# Patient Record
Sex: FEMALE | Race: OTHER | HISPANIC OR LATINO | Employment: FULL TIME | URBAN - METROPOLITAN AREA
[De-identification: names, ages, dates, MRNs, and addresses within clinical notes are randomized per-mention and may not be internally consistent; named-entity substitution may affect disease eponyms.]

---

## 2017-03-01 ENCOUNTER — ANESTHESIA EVENT (OUTPATIENT)
Dept: PERIOP | Facility: HOSPITAL | Age: 47
End: 2017-03-01
Payer: SELF-PAY

## 2017-03-01 RX ORDER — ACETAMINOPHEN 325 MG/1
650 TABLET ORAL EVERY 6 HOURS PRN
COMMUNITY

## 2017-03-01 RX ORDER — IBUPROFEN 200 MG
200 TABLET ORAL EVERY 6 HOURS PRN
COMMUNITY
End: 2017-03-03 | Stop reason: HOSPADM

## 2017-03-01 RX ORDER — MAG HYDROX/ALUMINUM HYD/SIMETH 400-400-40
5000 SUSPENSION, ORAL (FINAL DOSE FORM) ORAL DAILY
COMMUNITY
End: 2018-09-07 | Stop reason: ALTCHOICE

## 2017-03-01 RX ORDER — MULTIVITAMIN
1 TABLET ORAL DAILY
COMMUNITY
End: 2018-09-07 | Stop reason: ALTCHOICE

## 2017-03-02 ENCOUNTER — HOSPITAL ENCOUNTER (OUTPATIENT)
Facility: HOSPITAL | Age: 47
Setting detail: OBSERVATION
Discharge: HOME | End: 2017-03-03
Attending: PLASTIC SURGERY | Admitting: PLASTIC SURGERY
Payer: SELF-PAY

## 2017-03-02 ENCOUNTER — ANESTHESIA (OUTPATIENT)
Dept: PERIOP | Facility: HOSPITAL | Age: 47
End: 2017-03-02
Payer: SELF-PAY

## 2017-03-02 PROBLEM — Z41.1 ENCOUNTER FOR COSMETIC SURGERY: Status: ACTIVE | Noted: 2017-03-02

## 2017-03-02 RX ORDER — EPHEDRINE SULFATE 50 MG/ML
INJECTION, SOLUTION INTRAVENOUS AS NEEDED
Status: DISCONTINUED | OUTPATIENT
Start: 2017-03-02 | End: 2017-03-02 | Stop reason: SURG

## 2017-03-02 RX ORDER — FENTANYL CITRATE 50 UG/ML
INJECTION, SOLUTION INTRAMUSCULAR; INTRAVENOUS AS NEEDED
Status: DISCONTINUED | OUTPATIENT
Start: 2017-03-02 | End: 2017-03-02 | Stop reason: SURG

## 2017-03-02 RX ORDER — FENTANYL CITRATE/PF 50 MCG/ML
50 SYRINGE (ML) INJECTION
Status: COMPLETED | OUTPATIENT
Start: 2017-03-02 | End: 2017-03-02

## 2017-03-02 RX ORDER — ONDANSETRON 2 MG/ML
INJECTION INTRAMUSCULAR; INTRAVENOUS AS NEEDED
Status: DISCONTINUED | OUTPATIENT
Start: 2017-03-02 | End: 2017-03-02 | Stop reason: SURG

## 2017-03-02 RX ORDER — SCOLOPAMINE TRANSDERMAL SYSTEM 1 MG/1
1 PATCH, EXTENDED RELEASE TRANSDERMAL ONCE AS NEEDED
Status: DISCONTINUED | OUTPATIENT
Start: 2017-03-02 | End: 2017-03-02

## 2017-03-02 RX ORDER — ONDANSETRON 2 MG/ML
4 INJECTION INTRAMUSCULAR; INTRAVENOUS ONCE
Status: COMPLETED | OUTPATIENT
Start: 2017-03-02 | End: 2017-03-02

## 2017-03-02 RX ORDER — HYDROMORPHONE HYDROCHLORIDE 2 MG/ML
INJECTION, SOLUTION INTRAMUSCULAR; INTRAVENOUS; SUBCUTANEOUS AS NEEDED
Status: DISCONTINUED | OUTPATIENT
Start: 2017-03-02 | End: 2017-03-02 | Stop reason: SURG

## 2017-03-02 RX ORDER — SPIRONOLACTONE 50 MG/1
50 TABLET, FILM COATED ORAL DAILY
Status: DISCONTINUED | OUTPATIENT
Start: 2017-03-03 | End: 2017-03-03 | Stop reason: HOSPADM

## 2017-03-02 RX ORDER — MORPHINE SULFATE 4 MG/ML
3 INJECTION, SOLUTION INTRAMUSCULAR; INTRAVENOUS
Status: DISCONTINUED | OUTPATIENT
Start: 2017-03-02 | End: 2017-03-03 | Stop reason: HOSPADM

## 2017-03-02 RX ORDER — ONDANSETRON 2 MG/ML
4 INJECTION INTRAMUSCULAR; INTRAVENOUS EVERY 8 HOURS PRN
Status: DISCONTINUED | OUTPATIENT
Start: 2017-03-02 | End: 2017-03-02 | Stop reason: HOSPADM

## 2017-03-02 RX ORDER — OXYCODONE HYDROCHLORIDE AND ACETAMINOPHEN 5; 325 MG/1; MG/1
2 TABLET ORAL EVERY 4 HOURS PRN
Status: DISCONTINUED | OUTPATIENT
Start: 2017-03-02 | End: 2017-03-03 | Stop reason: HOSPADM

## 2017-03-02 RX ORDER — DOCUSATE SODIUM 100 MG/1
100 CAPSULE, LIQUID FILLED ORAL 2 TIMES DAILY
Status: DISCONTINUED | OUTPATIENT
Start: 2017-03-02 | End: 2017-03-03 | Stop reason: HOSPADM

## 2017-03-02 RX ORDER — PROPOFOL 10 MG/ML
INJECTION, EMULSION INTRAVENOUS AS NEEDED
Status: DISCONTINUED | OUTPATIENT
Start: 2017-03-02 | End: 2017-03-02 | Stop reason: SURG

## 2017-03-02 RX ORDER — PROMETHAZINE HYDROCHLORIDE 25 MG/1
25 TABLET ORAL EVERY 6 HOURS PRN
Status: DISCONTINUED | OUTPATIENT
Start: 2017-03-02 | End: 2017-03-03 | Stop reason: HOSPADM

## 2017-03-02 RX ORDER — MIDAZOLAM HYDROCHLORIDE 1 MG/ML
INJECTION INTRAMUSCULAR; INTRAVENOUS AS NEEDED
Status: DISCONTINUED | OUTPATIENT
Start: 2017-03-02 | End: 2017-03-02 | Stop reason: SURG

## 2017-03-02 RX ORDER — DIPHENHYDRAMINE HCL 25 MG
50 TABLET ORAL EVERY 6 HOURS PRN
Status: DISCONTINUED | OUTPATIENT
Start: 2017-03-02 | End: 2017-03-03 | Stop reason: HOSPADM

## 2017-03-02 RX ORDER — SCOLOPAMINE TRANSDERMAL SYSTEM 1 MG/1
1 PATCH, EXTENDED RELEASE TRANSDERMAL ONCE AS NEEDED
Status: DISCONTINUED | OUTPATIENT
Start: 2017-03-02 | End: 2017-03-02 | Stop reason: SDUPTHER

## 2017-03-02 RX ORDER — ONDANSETRON 2 MG/ML
4 INJECTION INTRAMUSCULAR; INTRAVENOUS EVERY 6 HOURS PRN
Status: DISCONTINUED | OUTPATIENT
Start: 2017-03-02 | End: 2017-03-03 | Stop reason: HOSPADM

## 2017-03-02 RX ORDER — SODIUM CHLORIDE, SODIUM LACTATE, POTASSIUM CHLORIDE, CALCIUM CHLORIDE 600; 310; 30; 20 MG/100ML; MG/100ML; MG/100ML; MG/100ML
175 INJECTION, SOLUTION INTRAVENOUS CONTINUOUS
Status: DISCONTINUED | OUTPATIENT
Start: 2017-03-02 | End: 2017-03-03 | Stop reason: HOSPADM

## 2017-03-02 RX ORDER — OXYCODONE HYDROCHLORIDE AND ACETAMINOPHEN 5; 325 MG/1; MG/1
2 TABLET ORAL EVERY 4 HOURS PRN
Status: DISCONTINUED | OUTPATIENT
Start: 2017-03-02 | End: 2017-03-02 | Stop reason: HOSPADM

## 2017-03-02 RX ORDER — SODIUM CHLORIDE 9 MG/ML
125 INJECTION, SOLUTION INTRAVENOUS CONTINUOUS
Status: DISCONTINUED | OUTPATIENT
Start: 2017-03-02 | End: 2017-03-02 | Stop reason: HOSPADM

## 2017-03-02 RX ORDER — ROCURONIUM BROMIDE 10 MG/ML
INJECTION, SOLUTION INTRAVENOUS AS NEEDED
Status: DISCONTINUED | OUTPATIENT
Start: 2017-03-02 | End: 2017-03-02 | Stop reason: SURG

## 2017-03-02 RX ADMIN — SCOPALAMINE 1 PATCH: 1 PATCH, EXTENDED RELEASE TRANSDERMAL at 10:23

## 2017-03-02 RX ADMIN — HYDROMORPHONE HYDROCHLORIDE 0.5 MG: 2 INJECTION, SOLUTION INTRAMUSCULAR; INTRAVENOUS; SUBCUTANEOUS at 15:39

## 2017-03-02 RX ADMIN — FENTANYL CITRATE 50 MCG: 50 INJECTION INTRAMUSCULAR; INTRAVENOUS at 18:45

## 2017-03-02 RX ADMIN — NEOSTIGMINE METHYLSULFATE 4 MG: 1 INJECTION INTRAMUSCULAR; INTRAVENOUS; SUBCUTANEOUS at 14:05

## 2017-03-02 RX ADMIN — FENTANYL CITRATE 100 MCG: 50 INJECTION, SOLUTION INTRAMUSCULAR; INTRAVENOUS at 14:16

## 2017-03-02 RX ADMIN — CEFAZOLIN SODIUM 2000 MG: 2 SOLUTION INTRAVENOUS at 16:52

## 2017-03-02 RX ADMIN — SODIUM CHLORIDE, SODIUM LACTATE, POTASSIUM CHLORIDE, AND CALCIUM CHLORIDE 175 ML/HR: .6; .31; .03; .02 INJECTION, SOLUTION INTRAVENOUS at 22:42

## 2017-03-02 RX ADMIN — ROCURONIUM BROMIDE 20 MG: 10 INJECTION, SOLUTION INTRAVENOUS at 14:46

## 2017-03-02 RX ADMIN — FENTANYL CITRATE 50 MCG: 50 INJECTION INTRAMUSCULAR; INTRAVENOUS at 18:01

## 2017-03-02 RX ADMIN — HYDROMORPHONE HYDROCHLORIDE 0.5 MG: 1 INJECTION, SOLUTION INTRAMUSCULAR; INTRAVENOUS; SUBCUTANEOUS at 19:09

## 2017-03-02 RX ADMIN — PROPOFOL 200 MG: 10 INJECTION, EMULSION INTRAVENOUS at 12:47

## 2017-03-02 RX ADMIN — ROCURONIUM BROMIDE 20 MG: 10 INJECTION, SOLUTION INTRAVENOUS at 13:22

## 2017-03-02 RX ADMIN — FENTANYL CITRATE 100 MCG: 50 INJECTION, SOLUTION INTRAMUSCULAR; INTRAVENOUS at 12:47

## 2017-03-02 RX ADMIN — SODIUM CHLORIDE 125 ML/HR: 0.9 INJECTION, SOLUTION INTRAVENOUS at 10:36

## 2017-03-02 RX ADMIN — EPHEDRINE SULFATE 10 MG: 50 INJECTION, SOLUTION INTRAMUSCULAR; INTRAVENOUS; SUBCUTANEOUS at 15:47

## 2017-03-02 RX ADMIN — HYDROMORPHONE HYDROCHLORIDE 0.5 MG: 2 INJECTION, SOLUTION INTRAMUSCULAR; INTRAVENOUS; SUBCUTANEOUS at 17:25

## 2017-03-02 RX ADMIN — DEXAMETHASONE SODIUM PHOSPHATE 4 MG: 10 INJECTION INTRAMUSCULAR; INTRAVENOUS at 15:28

## 2017-03-02 RX ADMIN — FENTANYL CITRATE 50 MCG: 50 INJECTION INTRAMUSCULAR; INTRAVENOUS at 17:55

## 2017-03-02 RX ADMIN — HYDROMORPHONE HYDROCHLORIDE 0.5 MG: 1 INJECTION, SOLUTION INTRAMUSCULAR; INTRAVENOUS; SUBCUTANEOUS at 18:58

## 2017-03-02 RX ADMIN — HYDROMORPHONE HYDROCHLORIDE 0.5 MG: 2 INJECTION, SOLUTION INTRAMUSCULAR; INTRAVENOUS; SUBCUTANEOUS at 14:12

## 2017-03-02 RX ADMIN — FENTANYL CITRATE 50 MCG: 50 INJECTION INTRAMUSCULAR; INTRAVENOUS at 18:11

## 2017-03-02 RX ADMIN — CEFAZOLIN SODIUM 2000 MG: 2 SOLUTION INTRAVENOUS at 12:52

## 2017-03-02 RX ADMIN — ROCURONIUM BROMIDE 10 MG: 10 INJECTION, SOLUTION INTRAVENOUS at 16:05

## 2017-03-02 RX ADMIN — SODIUM CHLORIDE 125 ML/HR: 0.9 INJECTION, SOLUTION INTRAVENOUS at 19:06

## 2017-03-02 RX ADMIN — FENTANYL CITRATE 100 MCG: 50 INJECTION, SOLUTION INTRAMUSCULAR; INTRAVENOUS at 13:22

## 2017-03-02 RX ADMIN — ONDANSETRON 4 MG: 2 INJECTION INTRAMUSCULAR; INTRAVENOUS at 18:22

## 2017-03-02 RX ADMIN — HYDROMORPHONE HYDROCHLORIDE 0.5 MG: 2 INJECTION, SOLUTION INTRAMUSCULAR; INTRAVENOUS; SUBCUTANEOUS at 16:32

## 2017-03-02 RX ADMIN — ROCURONIUM BROMIDE 50 MG: 10 INJECTION, SOLUTION INTRAVENOUS at 12:47

## 2017-03-02 RX ADMIN — ONDANSETRON HYDROCHLORIDE 4 MG: 2 INJECTION, SOLUTION INTRAVENOUS at 14:00

## 2017-03-02 RX ADMIN — MIDAZOLAM HYDROCHLORIDE 2 MG: 1 INJECTION, SOLUTION INTRAMUSCULAR; INTRAVENOUS at 12:37

## 2017-03-02 RX ADMIN — ONDANSETRON HYDROCHLORIDE 4 MG: 2 INJECTION, SOLUTION INTRAVENOUS at 15:19

## 2017-03-03 VITALS
HEART RATE: 100 BPM | OXYGEN SATURATION: 99 % | DIASTOLIC BLOOD PRESSURE: 83 MMHG | RESPIRATION RATE: 18 BRPM | BODY MASS INDEX: 37.56 KG/M2 | TEMPERATURE: 98.1 F | WEIGHT: 212 LBS | SYSTOLIC BLOOD PRESSURE: 130 MMHG | HEIGHT: 63 IN

## 2017-03-03 RX ORDER — HEPARIN SODIUM 5000 [USP'U]/ML
5000 INJECTION, SOLUTION INTRAVENOUS; SUBCUTANEOUS EVERY 8 HOURS SCHEDULED
Status: DISCONTINUED | OUTPATIENT
Start: 2017-03-03 | End: 2017-03-03 | Stop reason: HOSPADM

## 2017-03-03 RX ADMIN — SODIUM CHLORIDE, SODIUM LACTATE, POTASSIUM CHLORIDE, AND CALCIUM CHLORIDE 175 ML/HR: .6; .31; .03; .02 INJECTION, SOLUTION INTRAVENOUS at 06:47

## 2017-03-03 RX ADMIN — DOCUSATE SODIUM 100 MG: 100 CAPSULE, LIQUID FILLED ORAL at 09:12

## 2017-03-03 RX ADMIN — OXYCODONE HYDROCHLORIDE AND ACETAMINOPHEN 2 TABLET: 5; 325 TABLET ORAL at 06:46

## 2017-03-03 RX ADMIN — HEPARIN SODIUM 5000 UNITS: 5000 INJECTION, SOLUTION INTRAVENOUS; SUBCUTANEOUS at 09:38

## 2017-03-03 RX ADMIN — CEFAZOLIN SODIUM 2000 MG: 2 SOLUTION INTRAVENOUS at 06:46

## 2017-03-03 RX ADMIN — OXYCODONE HYDROCHLORIDE AND ACETAMINOPHEN 2 TABLET: 5; 325 TABLET ORAL at 03:02

## 2017-03-03 RX ADMIN — SPIRONOLACTONE 50 MG: 50 TABLET ORAL at 09:13

## 2017-03-03 RX ADMIN — PROMETHAZINE HYDROCHLORIDE 25 MG: 25 TABLET ORAL at 00:27

## 2017-03-03 RX ADMIN — ONDANSETRON 4 MG: 2 INJECTION INTRAMUSCULAR; INTRAVENOUS at 03:03

## 2017-03-03 RX ADMIN — OXYCODONE HYDROCHLORIDE AND ACETAMINOPHEN 2 TABLET: 5; 325 TABLET ORAL at 14:55

## 2017-03-03 RX ADMIN — CEFAZOLIN SODIUM 2000 MG: 2 SOLUTION INTRAVENOUS at 00:42

## 2017-03-03 RX ADMIN — OXYCODONE HYDROCHLORIDE AND ACETAMINOPHEN 2 TABLET: 5; 325 TABLET ORAL at 11:02

## 2018-09-04 ENCOUNTER — TELEPHONE (OUTPATIENT)
Dept: CARDIOLOGY CLINIC | Facility: CLINIC | Age: 48
End: 2018-09-04

## 2018-09-07 ENCOUNTER — OFFICE VISIT (OUTPATIENT)
Dept: CARDIOLOGY CLINIC | Facility: CLINIC | Age: 48
End: 2018-09-07
Payer: COMMERCIAL

## 2018-09-07 VITALS
OXYGEN SATURATION: 99 % | WEIGHT: 217.3 LBS | HEART RATE: 88 BPM | HEIGHT: 63 IN | SYSTOLIC BLOOD PRESSURE: 132 MMHG | BODY MASS INDEX: 38.5 KG/M2 | DIASTOLIC BLOOD PRESSURE: 70 MMHG

## 2018-09-07 DIAGNOSIS — R00.2 PALPITATIONS: ICD-10-CM

## 2018-09-07 DIAGNOSIS — R06.02 SHORTNESS OF BREATH: ICD-10-CM

## 2018-09-07 DIAGNOSIS — R07.9 CHEST PAIN, UNSPECIFIED TYPE: Primary | ICD-10-CM

## 2018-09-07 DIAGNOSIS — K21.9 GASTROESOPHAGEAL REFLUX DISEASE WITHOUT ESOPHAGITIS: ICD-10-CM

## 2018-09-07 PROCEDURE — 93000 ELECTROCARDIOGRAM COMPLETE: CPT | Performed by: INTERNAL MEDICINE

## 2018-09-07 PROCEDURE — 99204 OFFICE O/P NEW MOD 45 MIN: CPT | Performed by: INTERNAL MEDICINE

## 2018-09-07 RX ORDER — MULTIVITAMIN/IRON/FOLIC ACID 18MG-0.4MG
1 TABLET ORAL DAILY
Refills: 0 | COMMUNITY
Start: 2018-08-21 | End: 2018-10-19 | Stop reason: ALTCHOICE

## 2018-09-07 RX ORDER — TRETINOIN 0.5 MG/G
1 CREAM TOPICAL
COMMUNITY
Start: 2018-08-17 | End: 2021-09-29

## 2018-09-07 RX ORDER — MECLIZINE HCL 12.5 MG/1
12.5 TABLET ORAL EVERY 8 HOURS PRN
COMMUNITY
Start: 2018-08-17

## 2018-09-07 RX ORDER — FLUTICASONE PROPIONATE 50 MCG
2 SPRAY, SUSPENSION (ML) NASAL AS NEEDED
COMMUNITY
Start: 2017-10-30 | End: 2019-09-17

## 2018-09-07 NOTE — PROGRESS NOTES
Cardiology Consultation     Sincere Pendleton  [id number]  1970  [address] [facility] [place] [id number]      1  Chest pain, unspecified type  POCT ECG    Stress test only, exercise    Echo complete with contrast if indicated    Holter monitor - 24 hour   2  Gastroesophageal reflux disease without esophagitis     3  Palpitations  Echo complete with contrast if indicated    Holter monitor - 24 hour   4  Shortness of breath  Stress test only, exercise    Echo complete with contrast if indicated       Discussion/Summary:    Chest pain:  Somewhat atypical   Normal baseline EKG  Can check exercise treadmill test   GI evaluation is still undergoing  She only started her PPI about 3 days ago  Upper GI series found a hiatal hernia  Gastroenterology visit is scheduled already  Shortness of breath:  Likely multifactorial   Some evaluation ordered being performed by PCP  Check echocardiogram to exclude cardiac etiology  Palpitations:  Sound most consistent with PACs or PVCs  However, they are bothersome to her  Check 24 hour Holter monitor  Additional evaluation for arrhythmia with exercise treadmill test also as noted above  Discussed typical triggers  Advised to eliminate vaping MJ as could be a precipitant for this as well  Continue with other evaluation as per her PCP  Follow up after testing to review  History of Present Illness:    53-year-old female  She comes to the office today for evaluation of chest pain, shortness of breath, palpitations  She describes 3 distinct symptoms  The 1st is a pinch of her chest   This tends to come in groups of threes  It is random and not always related to exertion  It is located in the center of her chest, does not really radiate    This is different than her abdominal pain which was recently evaluated by her primary care physician for which she has Gastroenterology evaluation planned as well for hiatal hernia  However, there are concerns from their standpoint that these 2 are related  There is no association with food  No association with position  There are some positions which can make it worse  When she presses on her sternum when she has these types of symptoms, she feels a soreness which is different than this pinching pain  She also feels fatigue and shortness of breath  This has been a gradual occurrence  She is being evaluated for anemia, although last CBC was unremarkable  Had blood work done in August including lipid panel which was pretty unremarkable  Denies any PND or orthopnea  No leg edema  Finally, she reports palpitations  She has had a history of the same  They were evaluated about 15 years ago  She says that nothing significant was found  She was never on any medical therapy  However, there still bothersome  They got less frequent for a while, but now come a little bit more regularly  Described as faster pounding heartbeat which lasts for just a 2nd  It is uncomfortable for her  She has tried to identify any potential triggers, but none are found  Never lost consciousness  She does have symptoms of vertigo, and otherwise sometimes feels lightheaded  Does not know all of her family history  One brother has MS  Sister is healthy  Does not know parents family history  Denies history of tobacco use  No significant alcohol  She does vape a small amount of marijuana daily          Patient Active Problem List   Diagnosis    Excess skin of abdomen    Encounter for cosmetic surgery    Chest pain    Gastroesophageal reflux disease without esophagitis    Palpitations    Shortness of breath     Past Medical History:   Diagnosis Date    Acne     Anxiety     Depression     GERD (gastroesophageal reflux disease)     Morbidly obese ([address] )     Wears glasses      Social History     Social History    Marital status: /Civil Union     Spouse name: N/A    Number of children: N/A    Years of education: N/A     Occupational History    Not on file  Social History Main Topics    Smoking status: Never Smoker    Smokeless tobacco: Never Used    Alcohol use No    Drug use: No    Sexual activity: Not on file     Other Topics Concern    Not on file     Social History Narrative    No narrative on file      Family History   Problem Relation Age of Onset    Heart attack Neg Hx     Stroke Neg Hx     Anuerysm Neg Hx     Clotting disorder Neg Hx     Hypertension Neg Hx     Hyperlipidemia Neg Hx     Arrhythmia Neg Hx     Heart failure Neg Hx     Coronary artery disease Neg Hx     Sudden death Neg Hx         scd     Past Surgical History:   Procedure Laterality Date    BODY LIFT LOWER N/A 3/2/2017    Procedure: BODY LIFT ,[place] BUTTOCK ;  Surgeon: Seble Manley MD;  [facility];  Service:     CHOLECYSTECTOMY      LAPAROSCOPY FOR ECTOPIC PREGNANCY      x 2    VT EXCISE EXCESS SKIN TISSUE,ABDOMEN N/A 9/1/2016    Procedure: PANNICULECTOMY,  PLICATION MUSCLES ;  Surgeon: Seble Manley MD;  [facility];  Service: Plastics    VT SUCT Tia Finn N/A 9/1/2016    Procedure: LIPOSUCTION ABDOMEN ;  Surgeon: Seble Manley MD;  [facility];  Service: Plastics       Current Outpatient Prescriptions:     acetaminophen (TYLENOL) 325 mg tablet, Take 650 mg by mouth every 6 (six) hours as needed for mild pain, Disp: , Rfl:     [employer] OMEPRAZOLE 20 MG TBEC, Take 1 tablet by mouth daily, Disp: , Rfl: 0    fluticasone (FLONASE) 50 mcg/act nasal spray, 2 sprays into each nostril as needed, Disp: , Rfl:     meclizine (ANTIVERT) 12 5 MG tablet, Take 12 5 mg by mouth Three times a day, Disp: , Rfl:     spironolactone (ALDACTONE) 50 mg tablet, Take 50 mg by mouth daily  , Disp: , Rfl:     tretinoin (RETIN-A) 0 05 % cream, Apply 1 application topically daily at bedtime as needed, Disp: , Rfl:   Allergies   Allergen Reactions    Sertraline Rash     Other reaction(s): itchy rash, felt "loopy"       Vitals:    09/07/18 0952   BP: 132/70   BP Location: Left arm   Patient Position: Sitting   Cuff Size: Large   Pulse: 88   SpO2: 99%   Weight: 98 6 kg (217 lb 4 8 oz)   Height: 5' 3" (1 6 m)     Vitals:    09/07/18 0952   Weight: 98 6 kg (217 lb 4 8 oz)      Height: 5' 3" (160 cm)   Body mass index is 38 49 kg/m²  Physical Exam:  GENERAL: Alert, well appearing, and in no distress  HEENT:  PERRL, EOMI, no scleral icterus, no conjunctival pallor  NECK:  Supple, No elevated JVP, no thyromegaly, no carotid bruits  HEART:  Regular rate and rhythm, normal S1/S2, no S3/S4, no murmur or rub  LUNGS:  Clear to auscultation bilaterally  ABDOMEN:  Soft, non-tender, positive bowel sounds, no rebound or guarding  EXTREMITIES:  No edema  VASCULAR:  Normal pedal pulses   NEURO: No focal deficits,  SKIN: Normal without suspicious lesions on exposed skin      ROS:  Except as noted in HPI, is otherwise reviewed in detail and a 12 point review of systems is negative  Labs:  Lab Results   Component Value Date    INR 1 01 09/06/2013    GLUF 92 09/06/2013     Lab work reviewed in [facility]    EKG:  Sinus rhythm 88 beats per minute  Normal EKG

## 2018-09-28 ENCOUNTER — APPOINTMENT (EMERGENCY)
Dept: RADIOLOGY | Facility: HOSPITAL | Age: 48
End: 2018-09-28
Payer: COMMERCIAL

## 2018-09-28 ENCOUNTER — HOSPITAL ENCOUNTER (OUTPATIENT)
Facility: HOSPITAL | Age: 48
Setting detail: OBSERVATION
Discharge: HOME | End: 2018-09-29
Attending: EMERGENCY MEDICINE | Admitting: INTERNAL MEDICINE
Payer: COMMERCIAL

## 2018-09-28 DIAGNOSIS — R07.89 ATYPICAL CHEST PAIN: ICD-10-CM

## 2018-09-28 DIAGNOSIS — R07.9 CHEST PAIN: Primary | ICD-10-CM

## 2018-09-28 LAB
ANION GAP SERPL CALCULATED.3IONS-SCNC: 13 MMOL/L (ref 4–13)
APTT PPP: 24 SECONDS (ref 24–36)
BASOPHILS # BLD AUTO: 0.03 THOUSANDS/ΜL (ref 0–0.1)
BASOPHILS NFR BLD AUTO: 0 % (ref 0–1)
BUN SERPL-MCNC: 13 MG/DL (ref 5–25)
CALCIUM SERPL-MCNC: 8.9 MG/DL (ref 8.3–10.1)
CHLORIDE SERPL-SCNC: 99 MMOL/L (ref 100–108)
CO2 SERPL-SCNC: 21 MMOL/L (ref 21–32)
CREAT SERPL-MCNC: 0.7 MG/DL (ref 0.6–1.3)
EOSINOPHIL # BLD AUTO: 0.09 THOUSAND/ΜL (ref 0–0.61)
EOSINOPHIL NFR BLD AUTO: 1 % (ref 0–6)
ERYTHROCYTE [DISTWIDTH] IN BLOOD BY AUTOMATED COUNT: 12 % (ref 11.6–15.1)
GFR SERPL CREATININE-BSD FRML MDRD: 102 ML/MIN/1.73SQ M
GLUCOSE SERPL-MCNC: 95 MG/DL (ref 65–140)
HCT VFR BLD AUTO: 40.4 % (ref 34.8–46.1)
HGB BLD-MCNC: 13.8 G/DL (ref 11.5–15.4)
HOLD SPECIMEN: NORMAL
IMM GRANULOCYTES # BLD AUTO: 0.03 THOUSAND/UL (ref 0–0.2)
IMM GRANULOCYTES NFR BLD AUTO: 0 % (ref 0–2)
INR PPP: 0.96 (ref 0.86–1.17)
LYMPHOCYTES # BLD AUTO: 2.37 THOUSANDS/ΜL (ref 0.6–4.47)
LYMPHOCYTES NFR BLD AUTO: 21 % (ref 14–44)
MAGNESIUM SERPL-MCNC: 1.9 MG/DL (ref 1.6–2.6)
MCH RBC QN AUTO: 30.6 PG (ref 26.8–34.3)
MCHC RBC AUTO-ENTMCNC: 34.2 G/DL (ref 31.4–37.4)
MCV RBC AUTO: 90 FL (ref 82–98)
MONOCYTES # BLD AUTO: 0.66 THOUSAND/ΜL (ref 0.17–1.22)
MONOCYTES NFR BLD AUTO: 6 % (ref 4–12)
NEUTROPHILS # BLD AUTO: 7.88 THOUSANDS/ΜL (ref 1.85–7.62)
NEUTS SEG NFR BLD AUTO: 72 % (ref 43–75)
NRBC BLD AUTO-RTO: 0 /100 WBCS
PLATELET # BLD AUTO: 327 THOUSANDS/UL (ref 149–390)
PMV BLD AUTO: 9.6 FL (ref 8.9–12.7)
POTASSIUM SERPL-SCNC: 4.5 MMOL/L (ref 3.5–5.3)
PROTHROMBIN TIME: 12.5 SECONDS (ref 11.8–14.2)
RBC # BLD AUTO: 4.51 MILLION/UL (ref 3.81–5.12)
SODIUM SERPL-SCNC: 133 MMOL/L (ref 136–145)
TROPONIN I SERPL-MCNC: <0.02 NG/ML
WBC # BLD AUTO: 11.06 THOUSAND/UL (ref 4.31–10.16)

## 2018-09-28 PROCEDURE — 99220 PR INITIAL OBSERVATION CARE/DAY 70 MINUTES: CPT | Performed by: PHYSICIAN ASSISTANT

## 2018-09-28 PROCEDURE — 84484 ASSAY OF TROPONIN QUANT: CPT | Performed by: PHYSICIAN ASSISTANT

## 2018-09-28 PROCEDURE — 85610 PROTHROMBIN TIME: CPT | Performed by: PHYSICIAN ASSISTANT

## 2018-09-28 PROCEDURE — 99285 EMERGENCY DEPT VISIT HI MDM: CPT

## 2018-09-28 PROCEDURE — 71046 X-RAY EXAM CHEST 2 VIEWS: CPT

## 2018-09-28 PROCEDURE — 93005 ELECTROCARDIOGRAM TRACING: CPT

## 2018-09-28 PROCEDURE — 80048 BASIC METABOLIC PNL TOTAL CA: CPT | Performed by: PHYSICIAN ASSISTANT

## 2018-09-28 PROCEDURE — 85025 COMPLETE CBC W/AUTO DIFF WBC: CPT | Performed by: PHYSICIAN ASSISTANT

## 2018-09-28 PROCEDURE — 83735 ASSAY OF MAGNESIUM: CPT | Performed by: PHYSICIAN ASSISTANT

## 2018-09-28 PROCEDURE — 36415 COLL VENOUS BLD VENIPUNCTURE: CPT | Performed by: PHYSICIAN ASSISTANT

## 2018-09-28 PROCEDURE — 96360 HYDRATION IV INFUSION INIT: CPT

## 2018-09-28 PROCEDURE — 85730 THROMBOPLASTIN TIME PARTIAL: CPT | Performed by: PHYSICIAN ASSISTANT

## 2018-09-28 RX ORDER — MECLIZINE HCL 12.5 MG/1
12.5 TABLET ORAL EVERY 8 HOURS PRN
Status: DISCONTINUED | OUTPATIENT
Start: 2018-09-28 | End: 2018-09-29 | Stop reason: HOSPADM

## 2018-09-28 RX ORDER — FLUTICASONE PROPIONATE 50 MCG
2 SPRAY, SUSPENSION (ML) NASAL 2 TIMES DAILY PRN
Status: DISCONTINUED | OUTPATIENT
Start: 2018-09-28 | End: 2018-09-29 | Stop reason: HOSPADM

## 2018-09-28 RX ORDER — FLUTICASONE PROPIONATE 50 MCG
2 SPRAY, SUSPENSION (ML) NASAL AS NEEDED
Status: DISCONTINUED | OUTPATIENT
Start: 2018-09-28 | End: 2018-09-28

## 2018-09-28 RX ORDER — PANTOPRAZOLE SODIUM 40 MG/1
40 TABLET, DELAYED RELEASE ORAL
Status: DISCONTINUED | OUTPATIENT
Start: 2018-09-29 | End: 2018-09-29 | Stop reason: HOSPADM

## 2018-09-28 RX ORDER — ACETAMINOPHEN 325 MG/1
650 TABLET ORAL EVERY 6 HOURS PRN
Status: DISCONTINUED | OUTPATIENT
Start: 2018-09-28 | End: 2018-09-29 | Stop reason: HOSPADM

## 2018-09-28 RX ORDER — SPIRONOLACTONE 25 MG/1
50 TABLET ORAL DAILY
Status: DISCONTINUED | OUTPATIENT
Start: 2018-09-29 | End: 2018-09-29 | Stop reason: HOSPADM

## 2018-09-28 RX ORDER — SODIUM CHLORIDE 9 MG/ML
125 INJECTION, SOLUTION INTRAVENOUS CONTINUOUS
Status: DISCONTINUED | OUTPATIENT
Start: 2018-09-28 | End: 2018-09-28

## 2018-09-28 RX ORDER — ONDANSETRON 2 MG/ML
4 INJECTION INTRAMUSCULAR; INTRAVENOUS EVERY 6 HOURS PRN
Status: DISCONTINUED | OUTPATIENT
Start: 2018-09-28 | End: 2018-09-29 | Stop reason: HOSPADM

## 2018-09-28 RX ADMIN — SODIUM CHLORIDE 125 ML/HR: 0.9 INJECTION, SOLUTION INTRAVENOUS at 16:00

## 2018-09-28 NOTE — ED PROVIDER NOTES
History  Chief Complaint   Patient presents with    Chest Pain     Pt c/o 10/10 chest pain started today  Went to express care and was transported the ED via EMS  Pt c/o SOB  Chest pain comes and goes  This 69-year-old female presents emergency room with a 1-1/2 hour history of midsternal chest pain  She complains of associated diaphoresis  She denies any nausea  She denies any radiation of the pain  She is not presently a smoker  She does not use alcohol products  She does admit to occasionally using marijuana  She has had no recent use in the past week  She was seen at an urgent care center and was referred to the emergency room for further evaluation  She was given 324 mg of aspirin in route by the paramedics  Upon presentation to the emergency room she has no pain  She describes the pain as a squeezing sensation that is intermittent and waxes and wanes  She states she has difficulty swallowing when she has the pain  She is unaware of a past family history of any cardiac disease  Patient states that she has had intermittent chest pain that is only lasted seconds over the past month  She was seen by a cardiologist who setting her up for routine stress test in the near future  She is also set up for stress echo and a Holter monitor  She does have a past medical history that is positive for GERD, anxiety, depression, acne  Patient's differential diagnosis includes but is not limited to acute coronary syndrome, myocarditis, pericarditis, pulmonary embolism, aortic dissection, chest wall strain, soft diet is, gastritis, GERD          History provided by:  Patient  Chest Pain   Pain location:  Substernal area  Pain quality comment:  Squeezing  Pain radiates to:  Does not radiate  Pain radiates to the back: no    Pain severity:  No pain (8/10 at its worst, none now )  Onset quality:  Sudden  Duration:  2 hours  Timing:  Intermittent  Progression:  Resolved  Chronicity:  New  Context: at rest Context: not breathing, no drug use, not eating, not lifting, no movement, not raising an arm, no stress and no trauma    Relieved by:  Rest  Worsened by:  Nothing tried  Ineffective treatments:  None tried  Associated symptoms: anxiety, diaphoresis, dysphagia and heartburn    Associated symptoms: no abdominal pain, no AICD problem, no altered mental status, no anorexia, no back pain, no claudication, no cough, no dizziness, no fatigue, no fever, no headache, no lower extremity edema, no nausea, no near-syncope, no numbness, no orthopnea, no palpitations, no PND, no shortness of breath, no syncope, not vomiting and no weakness    Risk factors: obesity    Risk factors: no aortic disease, no birth control, no coronary artery disease, no diabetes mellitus, no Colin-Danlos syndrome, no high cholesterol, no hypertension, no immobilization, not male, no Marfan's syndrome, not pregnant, no prior DVT/PE, no smoking and no surgery        Prior to Admission Medications   Prescriptions Last Dose Informant Patient Reported? Taking? CVS OMEPRAZOLE 20 MG TBE 2018 at Unknown time Self Yes Yes   Sig: Take 1 tablet by mouth daily   acetaminophen (TYLENOL) 325 mg tablet  Self Yes No   Sig: Take 650 mg by mouth every 6 (six) hours as needed for mild pain   fluticasone (FLONASE) 50 mcg/act nasal spray  Self Yes No   Si sprays into each nostril as needed   meclizine (ANTIVERT) 12 5 MG tablet More than a month at Unknown time Self Yes No   Sig: Take 12 5 mg by mouth every 8 (eight) hours as needed     spironolactone (ALDACTONE) 50 mg tablet 2018 at Unknown time Self Yes Yes   Sig: Take 50 mg by mouth daily     tretinoin (RETIN-A) 0 05 % cream  Self Yes No   Sig: Apply 1 application topically daily at bedtime as needed      Facility-Administered Medications: None       Past Medical History:   Diagnosis Date    Acne     Anxiety     Depression     GERD (gastroesophageal reflux disease)     Morbidly obese ([address] )     Wears glasses        Past Surgical History:   Procedure Laterality Date    BODY LIFT LOWER N/A 3/2/2017    Procedure: BODY LIFT ,[place] BUTTOCK ;  Surgeon: Torrey Funes MD;  [facility];  Service:     CHOLECYSTECTOMY      LAPAROSCOPY FOR ECTOPIC PREGNANCY      x 2    [place] EXCISE EXCESS SKIN TISSUE,ABDOMEN N/A 9/1/2016    Procedure: PANNICULECTOMY,  PLICATION MUSCLES ;  Surgeon: Torrey Funes MD;  [facility];  Service: Plastics    NH SUCT Torrey Diaz N/A 9/1/2016    Procedure: LIPOSUCTION ABDOMEN ;  Surgeon: Torrey Funes MD;  [facility];  Service: Plastics       Family History   Problem Relation Age of Onset    Heart attack Neg Hx     Stroke Neg Hx     Anuerysm Neg Hx     Clotting disorder Neg Hx     Hypertension Neg Hx     Hyperlipidemia Neg Hx     Arrhythmia Neg Hx     Heart failure Neg Hx     Coronary artery disease Neg Hx     Sudden death Neg Hx         scd     I have reviewed and agree with the history as documented  Social History   Substance Use Topics    Smoking status: Never Smoker    Smokeless tobacco: Never Used    Alcohol use No        Review of Systems   Constitutional: Positive for activity change and diaphoresis  Negative for appetite change, chills, fatigue and fever  HENT: Positive for trouble swallowing  Negative for congestion, dental problem, ear discharge, ear pain, mouth sores, postnasal drip, rhinorrhea and sore throat  Eyes: Negative for pain, discharge, redness and itching  Respiratory: Negative for cough, chest tightness and shortness of breath  Cardiovascular: Positive for chest pain  Negative for palpitations, orthopnea, claudication, syncope, PND and near-syncope  Gastrointestinal: Positive for heartburn  Negative for abdominal pain, anorexia, nausea and vomiting  Musculoskeletal: Negative for back pain  Skin: Negative for color change, pallor and rash     Neurological: Negative for dizziness, weakness, numbness and headaches  Psychiatric/Behavioral: Negative for confusion  All other systems reviewed and are negative  Physical Exam  Physical Exam   Constitutional: She is oriented to person, place, and time  She appears well-developed and well-nourished  No distress  HENT:   Head: Normocephalic  Right Ear: External ear normal    Left Ear: External ear normal    Nose: Nose normal    Eyes: Conjunctivae are normal  Right eye exhibits no discharge  Left eye exhibits no discharge  Neck: Neck supple  Cardiovascular: Normal rate, regular rhythm and normal heart sounds  Exam reveals no gallop and no friction rub  No murmur heard  Pulmonary/Chest: Effort normal and breath sounds normal  No respiratory distress  She has no wheezes  She has no rales  Abdominal: Soft  She exhibits no distension  There is no tenderness  There is no rebound and no guarding  Musculoskeletal: She exhibits no edema  Lymphadenopathy:     She has no cervical adenopathy  Neurological: She is alert and oriented to person, place, and time  Skin: Skin is warm  Capillary refill takes less than 2 seconds  She is not diaphoretic  Psychiatric: She has a normal mood and affect  Her behavior is normal  Judgment and thought content normal    Nursing note and vitals reviewed        Vital Signs  ED Triage Vitals   Temperature Pulse Respirations Blood Pressure SpO2   09/28/18 1527 09/28/18 1527 09/28/18 1527 09/28/18 1527 09/28/18 1527   98 2 °F (36 8 °C) 86 20 (!) 181/79 100 %      Temp Source Heart Rate Source Patient Position - Orthostatic VS BP Location FiO2 (%)   09/28/18 1527 09/28/18 1527 09/28/18 1527 09/28/18 1527 --   Oral Monitor Sitting Right arm       Pain Score       09/28/18 1630       No Pain           Vitals:    09/28/18 1527 09/28/18 1630 09/28/18 1700   BP: (!) 181/79 103/66 106/57   Pulse: 86 76 68   Patient Position - Orthostatic VS: Sitting Lying Sitting       Visual Acuity      ED Medications  Medications   sodium chloride 0 9 % infusion (125 mL/hr Intravenous New Bag 9/28/18 1600)       Diagnostic Studies  Results Reviewed     Procedure Component Value Units Date/Time    Trauma tubes on hold [[id number]] Collected:  09/28/18 1553    Lab Status:  Final result Specimen:  Blood Updated:  09/28/18 1701    Narrative: The following orders were created for panel order Trauma tubes on hold  Procedure                               Abnormality         Status                     ---------                               -----------         ------                     Norm [place] top on VUIY[[id number]]                                  Final result               Grey top tube on ZURF[[id number]]                             Final result                 Please view results for these tests on the individual orders      Protime-INR [[id number]]  (Normal) Collected:  09/28/18 1550    Lab Status:  Final result Specimen:  Blood from Arm, Left Updated:  09/28/18 1620     Protime 12 5 seconds      INR 0 96    APTT [[id number]]  (Normal) Collected:  09/28/18 1550    Lab Status:  Final result Specimen:  Blood from Arm, Left Updated:  09/28/18 1620     PTT 24 seconds     Troponin I [[id number]]  (Normal) Collected:  09/28/18 1550    Lab Status:  Final result Specimen:  Blood from Arm, Left Updated:  09/28/18 1616     Troponin I <0 02 ng/mL     Basic metabolic panel [[id number]]  (Abnormal) Collected:  09/28/18 1550    Lab Status:  Final result Specimen:  Blood from Arm, Left Updated:  09/28/18 1613     Sodium 133 (L) mmol/L      Potassium 4 5 mmol/L      Chloride 99 (L) mmol/L      CO2 21 mmol/L      ANION GAP 13 mmol/L      BUN 13 mg/dL      Creatinine 0 70 mg/dL      Glucose 95 mg/dL      Calcium 8 9 mg/dL      eGFR 102 ml/min/1 73sq m     Narrative:         National Kidney Disease Education Program recommendations are as follows:  GFR calculation is accurate only with a steady state creatinine  Chronic Kidney disease less than 60 ml/min/1 73 sq  meters  Kidney failure less than 15 ml/min/1 73 sq  meters  Magnesium [[id number]]  (Normal) Collected:  09/28/18 1550    Lab Status:  Final result Specimen:  Blood from Arm, Left Updated:  09/28/18 1608     Magnesium 1 9 mg/dL     CBC and differential [[id number]]  (Abnormal) Collected:  09/28/18 1550    Lab Status:  Final result Specimen:  Blood from Arm, Left Updated:  09/28/18 1556     WBC 11 06 (H) Thousand/uL      RBC 4 51 Million/uL      Hemoglobin 13 8 g/dL      Hematocrit 40 4 %      MCV 90 fL      MCH 30 6 pg      MCHC 34 2 g/dL      RDW 12 0 %      MPV 9 6 fL      Platelets 908 Thousands/uL      nRBC 0 /100 WBCs      Neutrophils Relative 72 %      Immat GRANS % 0 %      Lymphocytes Relative 21 %      Monocytes Relative 6 %      Eosinophils Relative 1 %      Basophils Relative 0 %      Neutrophils Absolute 7 88 (H) Thousands/µL      Immature Grans Absolute 0 03 Thousand/uL      Lymphocytes Absolute 2 37 Thousands/µL      Monocytes Absolute 0 66 Thousand/µL      Eosinophils Absolute 0 09 Thousand/µL      Basophils Absolute 0 03 Thousands/µL                  XR chest 2 views   ED Interpretation by Ana Rosa Peace PA-C (09/28 1611)   No acute cardiopulmonary disease      Final Result by Lynnette Dave MD (09/28 1637)      No active pulmonary disease              Workstation performed: [id number]                    Procedures  ECG 12 Lead Documentation  Date/Time: 9/28/2018 3:47 PM  Performed by: Alia Nova  Authorized by: Alia Nova     Indications / Diagnosis:  Chest pain  ECG reviewed by me, the ED Provider: yes    Patient location:  ED  Previous ECG:     Previous ECG:  Compared to current    Comparison ECG info:  6/25/06    Similarity:  No change    Comparison to cardiac monitor: Yes    Interpretation:     Interpretation: non-specific    Rate:     ECG rate:  83    ECG rate assessment: normal    Rhythm:     Rhythm: sinus rhythm    Ectopy:     Ectopy: none    QRS:     QRS axis:  Normal    QRS intervals: Normal  Conduction:     Conduction: normal    ST segments:     ST segments:  Normal  T waves:     T waves: normal             Phone Contacts  ED Phone Contact    ED Course         HEART Risk Score      Most Recent Value   History  1 Filed at: 09/28/2018 1644   ECG  1 Filed at: 09/28/2018 1644   Age  1 Filed at: 09/28/2018 1644   Risk Factors  1 Filed at: 09/28/2018 1644   Troponin  0 Filed at: 09/28/2018 1644   Heart Score Risk Calculator   History  1 Filed at: 09/28/2018 1644   ECG  1 Filed at: 09/28/2018 1644   Age  1 Filed at: 09/28/2018 1644   Risk Factors  1 Filed at: 09/28/2018 1644   Troponin  0 Filed at: 09/28/2018 1644   HEART Score  4 Filed at: 09/28/2018 1644   HEART Score  4 Filed at: 09/28/2018 1644                            MDM  Number of Diagnoses or Management Options  Chest pain: new and requires workup     Amount and/or Complexity of Data Reviewed  Clinical lab tests: ordered and reviewed  Tests in the radiology section of CPT®: ordered and reviewed  Tests in the medicine section of CPT®: ordered and reviewed    Risk of Complications, Morbidity, and/or Mortality  Presenting problems: high  Diagnostic procedures: high  Management options: high  General comments: Patient presented to the emergency room with the midsternal chest pain  She was seen and evaluated  An EKG demonstrated a poor R-wave progression in the septal leads but no acute skin changes  Her laboratory studies were reviewed  Initial troponin was within normal limits  She was given 324 mg of aspirin upon arrival   She has been pain-free since that time  She was a heart score of 4  She was admitted to the [facility] service for telemetry for 23 hour observation stay        Patient Progress  Patient progress: stable    CritCare Time    Disposition  Final diagnoses:   Chest pain     Time reflects when diagnosis was documented in both MDM as applicable and the Disposition within this note     Time User Action Codes Description Comment 9/28/2018  5:13 PM Татьяна Palomo Add [R07 9] Chest pain       ED Disposition     ED Disposition Condition Comment    Discharge  Pierre Hutchison discharge to home/self care  Condition at discharge: Good        Follow-up Information    None         Patient's Medications   Discharge Prescriptions    No medications on file     No discharge procedures on file      ED Provider  Electronically Signed by           Racquel Small PA-C  09/28/18 0536

## 2018-09-28 NOTE — H&P
[address] Internal [facility]  H&P- Mary Osorio 1970, 51 y o  female MRN: [id number]    Unit/Bed#: CONY Encounter: [id number]    Primary Care Provider: Erick Sanz DO   Date and time admitted to hospital: 9/28/2018  3:22 PM        * Atypical chest pain   Assessment & Plan    · Not POA, 5 episodes of "pulling" sensation in the chest today accompanied by anxiety and dizziness and flushing  No pain right now, but chest wall is tender to palpation  EKG normal with good R wave progress  Troponin negative  JAYCOB = 0  HEART = 1  Very atypical for cardiac chest pain  She has echo, stress, and holter set up as outpatient  Known to Dr Mag Steward  · Admit patient to med/surg under observation status with telemetry monitoring  · Troponin   · EKG in AM   · Follow up as outpatient for testing        VTE Prophylaxis: None needed, ambulatory   / reason for no mechanical VTE prophylaxis Not needed as per protocol    Code Status: Full Code   POLST: POLST form is not discussed and not completed at this time  Discussion with family: Not requested     Anticipated Length of Stay:  Patient will be admitted on an Observation basis with an anticipated length of stay of  Less than 2 midnights  Justification for Hospital Stay: ACS rule out     Total Time for Visit, including Counseling / Coordination of Care: 1 hour  Greater than 50% of this total time spent on direct patient counseling and coordination of care  Chief Complaint:   Chest Pain    History of Present Illness:    Mary Osorio is a 51 y o  female with a history of vertigo who presents with chest pain  She reports that while she was at work today at her desk she felt a pulling sensation in the center of her chest  She reports that when she got up to walk around she became dizzy and anxious  She also experienced some flushing  She reports that this happened roughly 5 times so she drove herself to [facility] and was told to come here   She denied radiation of the pulling sensation to her neck, jaw, or arm  Denies nausea or vomiting  Denied coughing  Denies fevers or chills  She was seen by Dr Rita Smith earlier in the month, but states that those pains were "pinches" and she has and echo, stress, and holter set up for later in the month  She does not know her family history  Does not smoke  Review of Systems:    Review of Systems   Constitutional: Positive for diaphoresis ("Flushing", was not actually sweating)  Negative for appetite change, chills, fatigue and fever  HENT: Negative for congestion, rhinorrhea and sore throat  Eyes: Negative for visual disturbance  Respiratory: Negative for cough, chest tightness, shortness of breath and wheezing  Cardiovascular: Positive for chest pain (Atypical )  Negative for palpitations and leg swelling  Gastrointestinal: Negative for abdominal pain, constipation, diarrhea, nausea and vomiting  Genitourinary: Negative for dysuria  Musculoskeletal: Negative for arthralgias and myalgias  Neurological: Positive for dizziness  Negative for syncope, weakness, light-headedness, numbness and headaches  All other systems reviewed and are negative        Past Medical and Surgical History:     Past Medical History:   Diagnosis Date    Acne     Anxiety     Depression     GERD (gastroesophageal reflux disease)     Morbidly obese ([address] )     Wears glasses        Past Surgical History:   Procedure Laterality Date    BODY LIFT LOWER N/A 3/2/2017    Procedure: BODY LIFT ,[place] BUTTOCK ;  Surgeon: Nelson Unger MD;  Location[facility];  Service:     CHOLECYSTECTOMY      LAPAROSCOPY FOR ECTOPIC PREGNANCY      x 2    SD EXCISE EXCESS SKIN TISSUE,ABDOMEN N/A 9/1/2016    Procedure: PANNICULECTOMY,  PLICATION MUSCLES ;  Surgeon: Nelson Unger MD;  Location[facility];  Service: Plastics    SD SUCT Blease Skains N/A 9/1/2016    Procedure: LIPOSUCTION ABDOMEN ;  Surgeon: Nelson Unger MD;  Location[facility];  Service: Plastics       Meds/Allergies:    Prior to Admission medications    Medication Sig Start Date End Date Taking? Authorizing Provider   [employer] OMEPRAZOLE 20 MG TBEC Take 1 tablet by mouth daily 8/21/18  Yes Historical Provider, MD   spironolactone (ALDACTONE) 50 mg tablet Take 50 mg by mouth daily  Yes Historical Provider, MD   acetaminophen (TYLENOL) 325 mg tablet Take 650 mg by mouth every 6 (six) hours as needed for mild pain    Historical Provider, MD   fluticasone (FLONASE) 50 mcg/act nasal spray 2 sprays into each nostril as needed 10/30/17 10/30/18  Historical Provider, MD   meclizine (ANTIVERT) 12 5 MG tablet Take 12 5 mg by mouth every 8 (eight) hours as needed   8/17/18   Historical Provider, MD   tretinoin (RETIN-A) 0 05 % cream Apply 1 application topically daily at bedtime as needed 8/17/18 8/17/19  Historical Provider, MD     I have reviewed home medications with patient personally  Allergies:    Allergies   Allergen Reactions    Sertraline Rash     Other reaction(s): itchy rash, felt "loopy"       Social History:     Marital Status: /Civil Union   Occupation: Accounting   Patient Pre-hospital Living Situation: Home  Patient Pre-hospital Level of Mobility: full  Patient Pre-hospital Diet Restrictions: None  Substance Use History:   History   Alcohol Use No     History   Smoking Status    Never Smoker   Smokeless Tobacco    Never Used     History   Drug Use    Types: Marijuana     Comment: socially       Family History:    Family History   Problem Relation Age of Onset    Heart attack Neg Hx     Stroke Neg Hx     Anuerysm Neg Hx     Clotting disorder Neg Hx     Hypertension Neg Hx     Hyperlipidemia Neg Hx     Arrhythmia Neg Hx     Heart failure Neg Hx     Coronary artery disease Neg Hx     Sudden death Neg Hx         scd       Physical Exam:     Vitals:   Blood Pressure: 140/84 (09/28/18 1730)  Pulse: 66 (09/28/18 1730)  Temperature: 98 2 °F (36 8 °C) (09/28/18 1527)  Temp Source: Oral (09/28/18 1527)  Respirations: 18 (09/28/18 1730)  Weight - Scale: 98 7 kg (217 lb 9 5 oz) (09/28/18 1527)  SpO2: 100 % (09/28/18 1730)    Physical Exam   Constitutional: She is oriented to person, place, and time  Vital signs are normal  She appears well-developed and well-nourished  Non-toxic appearance  No distress  HENT:   Head: Normocephalic and atraumatic  Mouth/Throat: Mucous membranes are not dry  Eyes: Pupils are equal, round, and reactive to light  Conjunctivae and EOM are normal    Neck: Neck supple  Cardiovascular: Normal rate, regular rhythm, S1 normal, S2 normal, normal heart sounds and intact distal pulses  Exam reveals no S3 and no S4  No murmur heard  Pulmonary/Chest: Effort normal and breath sounds normal  No accessory muscle usage  No respiratory distress  She has no decreased breath sounds  She has no wheezes  She has no rhonchi  She has no rales  She exhibits tenderness (Tender to palpation at LLSB)  Abdominal: Soft  Bowel sounds are normal  She exhibits no distension and no mass  There is no tenderness  There is no rigidity, no rebound and no guarding  Neurological: She is alert and oriented to person, place, and time  She is not disoriented  GCS eye subscore is 4  GCS verbal subscore is 5  GCS motor subscore is 6  Skin: Skin is warm and dry  Additional Data:     Lab Results: I have personally reviewed pertinent reports          Results from last 7 days  Lab Units 09/28/18  1550   WBC Thousand/uL 11 06*   HEMOGLOBIN g/dL 13 8   HEMATOCRIT % 40 4   PLATELETS Thousands/uL 327   NEUTROS PCT % 72   LYMPHS PCT % 21   MONOS PCT % 6   EOS PCT % 1       Results from last 7 days  Lab Units 09/28/18  1550   SODIUM mmol/L 133*   POTASSIUM mmol/L 4 5   CHLORIDE mmol/L 99*   CO2 mmol/L 21   BUN mg/dL 13   CREATININE mg/dL 0 70   CALCIUM mg/dL 8 9       Results from last 7 days  Lab Units 09/28/18  1550   INR  0 96               Imaging: I have personally reviewed pertinent reports  XR chest 2 views   ED Interpretation by Stefania Jones PA-C (09/28 1611)   No acute cardiopulmonary disease      Final Result by Olayinka Mandujano MD (09/28 1637)      No active pulmonary disease  Workstation performed: [id number]             EKG, Pathology, and Other Studies Reviewed on Admission:   · EKG: NSR, 83 BPM  · CXR: No acute pulmonary disease     Allscripts / Epic Records Reviewed: Yes     ** Please Note: This note has been constructed using a voice recognition system   **

## 2018-09-28 NOTE — ASSESSMENT & PLAN NOTE
· Not POA, 5 episodes of "pulling" sensation in the chest today accompanied by anxiety and dizziness and flushing  No pain right now, but chest wall is tender to palpation  EKG normal with good R wave progress  Troponin negative  JAYCOB = 0  HEART = 1  Very atypical for cardiac chest pain  She has echo, stress, and holter set up as outpatient   Known to Dr Hubert Espinal  · Admit patient to med/surg under observation status with telemetry monitoring  · Troponin   · EKG in AM   · Follow up as outpatient for testing

## 2018-09-29 VITALS
WEIGHT: 211.2 LBS | OXYGEN SATURATION: 98 % | HEART RATE: 64 BPM | TEMPERATURE: 98 F | SYSTOLIC BLOOD PRESSURE: 117 MMHG | DIASTOLIC BLOOD PRESSURE: 59 MMHG | HEIGHT: 63 IN | BODY MASS INDEX: 37.42 KG/M2 | RESPIRATION RATE: 18 BRPM

## 2018-09-29 LAB
ATRIAL RATE: 71 BPM
ATRIAL RATE: 83 BPM
P AXIS: 59 DEGREES
P AXIS: 6 DEGREES
PR INTERVAL: 130 MS
PR INTERVAL: 130 MS
QRS AXIS: 36 DEGREES
QRS AXIS: 52 DEGREES
QRSD INTERVAL: 66 MS
QRSD INTERVAL: 68 MS
QT INTERVAL: 348 MS
QT INTERVAL: 416 MS
QTC INTERVAL: 408 MS
QTC INTERVAL: 452 MS
T WAVE AXIS: 26 DEGREES
T WAVE AXIS: 41 DEGREES
VENTRICULAR RATE: 71 BPM
VENTRICULAR RATE: 83 BPM

## 2018-09-29 PROCEDURE — 93005 ELECTROCARDIOGRAM TRACING: CPT

## 2018-09-29 PROCEDURE — 93010 ELECTROCARDIOGRAM REPORT: CPT | Performed by: INTERNAL MEDICINE

## 2018-09-29 PROCEDURE — 99217 PR OBSERVATION CARE DISCHARGE MANAGEMENT: CPT | Performed by: PHYSICIAN ASSISTANT

## 2018-09-29 RX ORDER — NAPROXEN SODIUM 220 MG
440 TABLET ORAL 2 TIMES DAILY WITH MEALS
Refills: 0
Start: 2018-09-29 | End: 2019-02-22 | Stop reason: ALTCHOICE

## 2018-09-29 RX ADMIN — SPIRONOLACTONE 50 MG: 25 TABLET, FILM COATED ORAL at 09:32

## 2018-09-29 RX ADMIN — PANTOPRAZOLE SODIUM 40 MG: 40 TABLET, DELAYED RELEASE ORAL at 07:22

## 2018-09-29 RX ADMIN — MECLIZINE 12.5 MG: 12.5 TABLET ORAL at 11:01

## 2018-09-29 RX ADMIN — ACETAMINOPHEN 650 MG: 325 TABLET, FILM COATED ORAL at 11:47

## 2018-09-29 NOTE — ASSESSMENT & PLAN NOTE
· Not POA, 5 episodes of "pulling" sensation in the chest today accompanied by anxiety and dizziness and flushing  No pain right now, but chest wall is tender to palpation  EKG normal with good R wave progress  Troponin negative  JAYCOB = 0  HEART = 1  Very atypical for cardiac chest pain  She has echo, stress, and holter set up as outpatient  Known to Dr Orlin Harkins  Tele, repeat EKG, and troponin all are negative    · Stable for discharge   · Trial NSAID for 1 week  · Follow up as outpatient for testing

## 2018-09-29 NOTE — DISCHARGE SUMMARY
[facility] Internal Medicine  Discharge- Sage Hi 1970, 51 y o  female MRN: [id number]    Unit/Bed#: -01 Encounter: [id number]    Primary Care Provider: Benoit Rothman DO   Date and time admitted to hospital: 9/28/2018  3:22 PM        * Atypical chest pain   Assessment & Plan    · Not POA, 5 episodes of "pulling" sensation in the chest today accompanied by anxiety and dizziness and flushing  No pain right now, but chest wall is tender to palpation  EKG normal with good R wave progress  Troponin negative  JAYCOB = 0  HEART = 1  Very atypical for cardiac chest pain  She has echo, stress, and holter set up as outpatient  Known to Dr Anahy Whaley, repeat EKG, and troponin all are negative  · Stable for discharge   · Trial NSAID for 1 week  · Follow up as outpatient for testing        Discharging Physician / Practitioner: Davon Stapleton PA-C  PCP: Benoit Rothman DO  Admission Date:   Admission Orders     Ordered        09/28/18 1714  Place in Observation (expected length of stay for this patient is less than two midnights)  Once             Discharge Date: 09/29/18    Resolved Problems  Date Reviewed: 9/29/2018    None          Consultations During Hospital Stay:  · None    Procedures Performed:     · CXR: No acute pulmonary disease   · EKG: normal   · Telemetry: No events    Significant Findings / Test Results:     · As above     Incidental Findings:   · As above      Test Results Pending at Discharge (will require follow up): · None     Outpatient Tests Requested:  · As per Dr Higginbotham     Complications:  None    Reason for Admission: Chest Pain    Hospital Course:     Sage Hi is a 51 y o  female patient who originally presented to the hospital on 9/28/2018 due to chest pain  Her pain was felt to be atypical given reproducibility on palpation, however the ER calculated a HEART score of 4 and felt the patient needed to be admitted   Her work up in the ER including CXR, EKG, and troponin was negative  Her HEART score when recalculated was 1 and her JAYCOB score was 0  She was admitted, placed on telemetry, troponin was trended, and a repeat EKG was ordered in the morning  All of her diagnostic testing was negative and she was stable for discharge  It was likely that the patient had some anxiety about her health causing an atypical chest pain  She was instructed to follow up with her already scheduled outpatient cardiac testing  Please see above list of diagnoses and related plan for additional information  Condition at Discharge: stable     Discharge Day Visit / Exam:     Subjective:  Patient has no complaints  She feels better knowing that her symptoms are likely not due to her heart  Denies SOB  Denies fevers or chills  Vitals: Blood Pressure: 117/59 (09/29/18 0900)  Pulse: 64 (09/29/18 0700)  Temperature: 98 °F (36 7 °C) (09/29/18 0700)  Temp Source: Oral (09/29/18 0700)  Respirations: 18 (09/29/18 0700)  Height: 5' 3" (160 cm) (09/28/18 1838)  Weight - Scale: 95 8 kg (211 lb 3 2 oz) (09/28/18 1838)  SpO2: 98 % (09/29/18 0700)  Exam:   Physical Exam   Constitutional: She is oriented to person, place, and time  Vital signs are normal  She appears well-developed and well-nourished  Non-toxic appearance  No distress  HENT:   Head: Normocephalic and atraumatic  Eyes: Pupils are equal, round, and reactive to light  Conjunctivae and EOM are normal    Neck: Neck supple  Cardiovascular: Normal rate, regular rhythm, S1 normal, S2 normal, normal heart sounds and intact distal pulses  Exam reveals no S3 and no S4  No murmur heard  Pulmonary/Chest: Effort normal and breath sounds normal  No accessory muscle usage  No respiratory distress  She has no decreased breath sounds  She has no wheezes  She has no rhonchi  She has no rales  She exhibits no tenderness  Abdominal: Soft  Bowel sounds are normal  She exhibits no distension and no mass  There is no tenderness   There is no rigidity, no rebound and no guarding  Neurological: She is alert and oriented to person, place, and time  She is not disoriented  GCS eye subscore is 4  GCS verbal subscore is 5  GCS motor subscore is 6  Skin: Skin is warm and dry  Discussion with Family: None requested     Discharge instructions/Information to patient and family:   See after visit summary for information provided to patient and family  Provisions for Follow-Up Care:  See after visit summary for information related to follow-up care and any pertinent home health orders  Disposition:     Home    For Discharges to [facility] SNF:   · Not Applicable to this Patient - Not Applicable to this Patient    Planned Readmission: None     Discharge Statement:  I spent 45 minutes discharging the patient  This time was spent on the day of discharge  I had direct contact with the patient on the day of discharge  Greater than 50% of the total time was spent examining patient, answering all patient questions, arranging and discussing plan of care with patient as well as directly providing post-discharge instructions  Additional time then spent on discharge activities  Discharge Medications:  See after visit summary for reconciled discharge medications provided to patient and family        ** Please Note: This note has been constructed using a voice recognition system **

## 2018-09-29 NOTE — CASE MANAGEMENT
Initial Clinical Review    Admission: Date/Time/Statement: 9/28 @ 1714    Orders Placed This Encounter   Procedures    Place in Observation (expected length of stay for this patient is less than two midnights)     Standing Status:   Standing     Number of Occurrences:   1     Order Specific Question:   Admitting Physician     Answer:   Daniel Pastrana     Order Specific Question:   Level of Care     Answer:   Med Surg [16]     52 yo female 5 episodes of chest pain while at work at her desk  Admitted as OBS for ACS rule out    ED: Date/Time/Mode of Arrival:   ED Arrival Information     Expected Arrival Acuity Means of Arrival Escorted By Service Admission Type    - 9/28/2018 15:22 Urgent Ambulance [facility] General Medicine Urgent    Arrival Complaint    chest pain, shortness of breath          Chief Complaint:   Chief Complaint   Patient presents with    Chest Pain     Pt c/o 10/10 chest pain started today  Went to express care and was transported the ED via EMS  Pt c/o SOB  Chest pain comes and goes  History of Illness: Nilam Noriega is a 51 y o  female with a history of vertigo who presents with chest pain  She reports that while she was at work today at her desk she felt a pulling sensation in the center of her chest  She reports that when she got up to walk around she became dizzy and anxious  She also experienced some flushing  She reports that this happened roughly 5 times so she drove herself to [facility] and was told to come here  She denied radiation of the pulling sensation to her neck, jaw, or arm  She was seen by Dr Jennifer Shah earlier in the month, but states that those pains were "pinches" and she has and echo, stress, and holter set up for later in the month  She does not know her family history   Does not smoke         ED Vital Signs:   ED Triage Vitals   Temperature Pulse Respirations Blood Pressure SpO2   09/28/18 1527 09/28/18 1527 09/28/18 1527 09/28/18 1527 09/28/18 1527   98 2 °F (36 8 °C) 86 20 (!) 181/79 100 %      Temp Source Heart Rate Source Patient Position - Orthostatic VS BP Location FiO2 (%)   09/28/18 1527 09/28/18 1527 09/28/18 1527 09/28/18 1527 --   Oral Monitor Sitting Right arm       Pain Score       09/28/18 1630       No Pain        Wt Readings from Last 1 Encounters:   09/28/18 95 8 kg (211 lb 3 2 oz)       Vital Signs (abnormal): Abnormal Labs/Diagnostic Test Results:   WBC 11 06 Na 133,   EKG NSR 83   Troponin <0 02, <0 02, <0 02    ED Treatment:   Medication Administration from 09/28/2018 1522 to 09/28/2018 1825       Date/Time Order Dose Route Action Comments     09/28/2018 1741 sodium chloride 0 9 % infusion 0 mL/hr Intravenous Stopped      09/28/2018 1600 sodium chloride 0 9 % infusion 125 mL/hr Intravenous New Bag           Past Medical/Surgical History: Active Ambulatory Problems     Diagnosis Date Noted    Excess skin of abdomen 09/01/2016    Encounter for cosmetic surgery 03/02/2017    Atypical chest pain 09/07/2018    Gastroesophageal reflux disease without esophagitis 09/07/2018    Palpitations 09/07/2018    Shortness of breath 09/07/2018     Resolved Ambulatory Problems     Diagnosis Date Noted    No Resolved Ambulatory Problems     Past Medical History:   Diagnosis Date    Acne     Anxiety     Depression     GERD (gastroesophageal reflux disease)     Morbidly obese (HCC)     Wears glasses        Admitting Diagnosis: Shortness of breath [R06 02]  Chest pain [R07 9]    Age/Sex: 51 y o  female    Assessment/Plan:   * Atypical chest pain   Assessment & Plan     · Not POA, 5 episodes of "pulling" sensation in the chest today accompanied by anxiety and dizziness and flushing  No pain right now, but chest wall is tender to palpation  EKG normal with good R wave progress  Troponin negative  JAYCOB = 0  HEART = 1  Very atypical for cardiac chest pain  She has echo, stress, and holter set up as outpatient   Known to   Georgiana Stevenson  ? Admit patient to med/surg under observation status with telemetry monitoring  ? Troponin   ? EKG in AM   ? Follow up as outpatient for testing          VTE Prophylaxis: None needed, ambulatory   / reason for no mechanical VTE prophylaxis Not needed as per protocol    Code Status: Full Code   POLST: POLST form is not discussed and not completed at this time  Discussion with family: Not requested      Anticipated Length of Stay:  Patient will be admitted on an Observation basis with an anticipated length of stay of  Less than 2 midnights  Justification for Hospital Stay: ACS rule out          Admission Orders:  Scheduled Meds:   Current Facility-Administered Medications:  acetaminophen 650 mg Oral Q6H PRN ASHWINI Nicolas-KEILA   fluticasone 2 spray Each Nare BID PRN Mylinronn Barrera PA-C   meclizine 12 5 mg Oral Q8H PRN Bernard Moreno PA-C   ondansetron 4 mg Intravenous Q6H PRN Bernardsukhjinder Matta PA-C   pantoprazole 40 mg Oral Early Morning Bernardsukhjinder Matta PA-C   spironolactone 50 mg Oral Daily Bernardsukhjinder Matta PA-C     Continuous Infusions:    PRN Meds:   acetaminophen    fluticasone    meclizine    ondansetron    Telemetry  EKG 9/29   Regular diet    Thank you,  [address]  Utilization Review Department  Phone: [phone]; Fax [phone]  ATTENTION: Please call with any questions or concerns to [phone]  and carefully follow the prompts so that you are directed to the right person  Send all requests for admission clinical reviews, approved or denied determinations and any other requests to fax [phone]   All voicemails are confidential

## 2018-10-02 ENCOUNTER — HOSPITAL ENCOUNTER (OUTPATIENT)
Dept: NON INVASIVE DIAGNOSTICS | Facility: CLINIC | Age: 48
Discharge: HOME | End: 2018-10-02
Payer: COMMERCIAL

## 2018-10-02 DIAGNOSIS — R07.9 CHEST PAIN, UNSPECIFIED TYPE: ICD-10-CM

## 2018-10-02 DIAGNOSIS — R00.2 PALPITATIONS: ICD-10-CM

## 2018-10-02 PROCEDURE — 93225 XTRNL ECG REC<48 HRS REC: CPT

## 2018-10-02 PROCEDURE — 93226 XTRNL ECG REC<48 HR SCAN A/R: CPT

## 2018-10-04 PROCEDURE — 93227 XTRNL ECG REC<48 HR R&I: CPT | Performed by: INTERNAL MEDICINE

## 2018-10-18 ENCOUNTER — HOSPITAL ENCOUNTER (OUTPATIENT)
Dept: NON INVASIVE DIAGNOSTICS | Facility: CLINIC | Age: 48
Discharge: HOME | End: 2018-10-18
Payer: COMMERCIAL

## 2018-10-18 DIAGNOSIS — R06.02 SHORTNESS OF BREATH: ICD-10-CM

## 2018-10-18 DIAGNOSIS — R07.9 CHEST PAIN, UNSPECIFIED TYPE: ICD-10-CM

## 2018-10-18 DIAGNOSIS — R00.2 PALPITATIONS: ICD-10-CM

## 2018-10-18 LAB
CHEST PAIN STATEMENT: NORMAL
MAX DIASTOLIC BP: 82 MMHG
MAX HEART RATE: 179 BPM
MAX PREDICTED HEART RATE: 171 BPM
MAX. SYSTOLIC BP: 172 MMHG
PROTOCOL NAME: NORMAL
REASON FOR TERMINATION: NORMAL
TARGET HR FORMULA: NORMAL
TEST INDICATION: NORMAL
TIME IN EXERCISE PHASE: NORMAL

## 2018-10-18 PROCEDURE — 93306 TTE W/DOPPLER COMPLETE: CPT | Performed by: INTERNAL MEDICINE

## 2018-10-18 PROCEDURE — 93306 TTE W/DOPPLER COMPLETE: CPT

## 2018-10-18 PROCEDURE — 93016 CV STRESS TEST SUPVJ ONLY: CPT | Performed by: INTERNAL MEDICINE

## 2018-10-18 PROCEDURE — 93018 CV STRESS TEST I&R ONLY: CPT | Performed by: INTERNAL MEDICINE

## 2018-10-18 PROCEDURE — 93017 CV STRESS TEST TRACING ONLY: CPT

## 2018-10-19 ENCOUNTER — OFFICE VISIT (OUTPATIENT)
Dept: CARDIOLOGY CLINIC | Facility: CLINIC | Age: 48
End: 2018-10-19
Payer: COMMERCIAL

## 2018-10-19 VITALS
DIASTOLIC BLOOD PRESSURE: 62 MMHG | OXYGEN SATURATION: 99 % | HEART RATE: 88 BPM | SYSTOLIC BLOOD PRESSURE: 124 MMHG | BODY MASS INDEX: 38.55 KG/M2 | WEIGHT: 217.6 LBS | HEIGHT: 63 IN

## 2018-10-19 DIAGNOSIS — R07.89 ATYPICAL CHEST PAIN: Primary | ICD-10-CM

## 2018-10-19 PROCEDURE — 99213 OFFICE O/P EST LOW 20 MIN: CPT | Performed by: INTERNAL MEDICINE

## 2018-10-19 NOTE — LETTER
October 19, 2018     Marcie Nova DO  [address] [facility] [address]    Patient: Johnnie Easton   YOB: 1970   Date of Visit: 10/19/2018       Dear Dr Sheryle Emory: Thank you for referring Yoli Dinh to me for evaluation  Below are my notes for this consultation  If you have questions, please do not hesitate to call me  I look forward to following your patient along with you  Sincerely,        Bob Armenta MD        CC: No Recipients  Bob Armenta MD  10/19/2018  2:57 PM  Sign at close encounter                                             Cardiology Consultation     Johnnie Easton  [id number]  1970  [facility] [address]      1  Atypical chest pain         Discussion/Summary:    Atypical chest pain  Very reassuring testing  Discussed with patient  No additional testing necessary from a cardiac standpoint currently  Can follow up with her primary care physician to evaluate other etiologies such as musculoskeletal or reflux  Return to office with any change in symptoms  Interval history:  Since last visit, patient was admitted to the [facility]  She was having symptoms of chest discomfort and chest pain  She had 5 sharp episodes which were back to back  She went to an urgent care, min was then transported to the emergency room  She was admitted overnight  Serial cardiac enzymes were negative  Telemetry unremarkable  She was discharged home  She had her stress test and echocardiogram performed yesterday  There were no significant abnormalities out of the expectation for her age  She also had Holter monitor done earlier in October  No significant arrhythmias identified          Patient Active Problem List   Diagnosis    Excess skin of abdomen    Encounter for cosmetic surgery    Atypical chest pain    Gastroesophageal reflux disease without esophagitis    Palpitations    Shortness of breath     Past Medical History:   Diagnosis Date    Acne     Anxiety     Depression     GERD (gastroesophageal reflux disease)     Morbidly obese ([address] )     Wears glasses      Social History     Social History    Marital status: /Civil Union     Spouse name: N/A    Number of children: N/A    Years of education: N/A     Occupational History    Not on file       Social History Main Topics    Smoking status: Never Smoker    Smokeless tobacco: Never Used    Alcohol use No    Drug use: Yes     Types: Marijuana      Comment: socially    Sexual activity: Not on file     Other Topics Concern    Not on file     Social History Narrative    No narrative on file      Family History   Problem Relation Age of Onset    Heart attack Neg Hx     Stroke Neg Hx     Anuerysm Neg Hx     Clotting disorder Neg Hx     Hypertension Neg Hx     Hyperlipidemia Neg Hx     Arrhythmia Neg Hx     Heart failure Neg Hx     Coronary artery disease Neg Hx     Sudden death Neg Hx         scd     Past Surgical History:   Procedure Laterality Date    BODY LIFT LOWER N/A 3/2/2017    Procedure: BODY LIFT ,[place] BUTTOCK ;  Surgeon: Missy Flowers MD;  [facility];  Service:     CHOLECYSTECTOMY      LAPAROSCOPY FOR ECTOPIC PREGNANCY      x 2    [place] EXCISE EXCESS SKIN TISSUE,ABDOMEN N/A 9/1/2016    Procedure: PANNICULECTOMY,  PLICATION MUSCLES ;  Surgeon: Missy Flowers MD;  [facility];  Service: Plastics    SD SUCT Don Prude N/A 9/1/2016    Procedure: LIPOSUCTION ABDOMEN ;  Surgeon: Missy Flowers MD;  [facility];  Service: Plastics       Current Outpatient Prescriptions:     acetaminophen (TYLENOL) 325 mg tablet, Take 650 mg by mouth every 6 (six) hours as needed for mild pain, Disp: , Rfl:     fluticasone (FLONASE) 50 mcg/act nasal spray, 2 sprays into each nostril as needed, Disp: , Rfl:     meclizine (ANTIVERT) 12 5 MG tablet, Take 12 5 mg by mouth every 8 (eight) hours as needed  , Disp: , Rfl:     naproxen sodium (ALEVE) 220 MG tablet, Take 2 tablets (440 mg total) by mouth 2 (two) times a day with meals for 7 days, Disp: , Rfl: 0    spironolactone (ALDACTONE) 50 mg tablet, Take 50 mg by mouth daily  , Disp: , Rfl:     tretinoin (RETIN-A) 0 05 % cream, Apply 1 application topically daily at bedtime as needed, Disp: , Rfl:   Allergies   Allergen Reactions    Sertraline Rash     Other reaction(s): itchy rash, felt "loopy"       Vitals:    10/19/18 1305   BP: 124/62   BP Location: Left arm   Patient Position: Sitting   Cuff Size: Adult   Pulse: 88   SpO2: 99%   Weight: 98 7 kg (217 lb 9 6 oz)   Height: 5' 3" (1 6 m)     Vitals:    10/19/18 1305   Weight: 98 7 kg (217 lb 9 6 oz)      Height: 5' 3" (160 cm)   Body mass index is 38 55 kg/m²  Physical Exam:  GEN: Anahy Farrell appears well, alert and oriented x 3, pleasant and cooperative   HEENT: pupils equal, round, and reactive to light; extraocular muscles intact  NECK: supple, no carotid bruits   HEART: regular rhythm, normal S1 and S2, no murmurs, clicks, gallops or rubs   LUNGS: clear to auscultation bilaterally; no wheezes, rales, or rhonchi   ABDOMEN: normal bowel sounds, soft, no tenderness, no distention  EXTREMITIES: peripheral pulses normal; no clubbing, cyanosis, or edema  NEURO: no focal findings   SKIN: normal without suspicious lesions on exposed skin      ROS:  Except as noted in HPI, is otherwise reviewed in detail and a 12 point review of systems is negative      Labs:  Lab Results   Component Value Date     (L) 09/28/2018    K 4 5 09/28/2018    CL 99 (L) 09/28/2018    CREATININE 0 70 09/28/2018    BUN 13 09/28/2018    CO2 21 09/28/2018    INR 0 96 09/28/2018    GLUF 92 09/06/2013    WBC 11 06 (H) 09/28/2018    HGB 13 8 09/28/2018    HCT 40 4 09/28/2018     09/28/2018

## 2018-10-19 NOTE — PROGRESS NOTES
Cardiology Consultation     Pierre Hutchison  [id number]  1970  [address] [facility] [address]      1  Atypical chest pain         Discussion/Summary:    Atypical chest pain  Very reassuring testing  Discussed with patient  No additional testing necessary from a cardiac standpoint currently  Can follow up with her primary care physician to evaluate other etiologies such as musculoskeletal or reflux  Return to office with any change in symptoms  Interval history:  Since last visit, patient was admitted to the [address] [place] [address]  She was having symptoms of chest discomfort and chest pain  She had 5 sharp episodes which were back to back  She went to an urgent care, min was then transported to the emergency room  She was admitted overnight  Serial cardiac enzymes were negative  Telemetry unremarkable  She was discharged home  She had her stress test and echocardiogram performed yesterday  There were no significant abnormalities out of the expectation for her age  She also had Holter monitor done earlier in October  No significant arrhythmias identified  Patient Active Problem List   Diagnosis    Excess skin of abdomen    Encounter for cosmetic surgery    Atypical chest pain    Gastroesophageal reflux disease without esophagitis    Palpitations    Shortness of breath     Past Medical History:   Diagnosis Date    Acne     Anxiety     Depression     GERD (gastroesophageal reflux disease)     Morbidly obese ([address] )     Wears glasses      Social History     Social History    Marital status: /Civil Union     Spouse name: N/A    Number of children: N/A    Years of education: N/A     Occupational History    Not on file  Social History Main Topics    Smoking status: Never Smoker    Smokeless tobacco: Never Used    Alcohol use No    Drug use:  Yes Types: Marijuana      Comment: socially    Sexual activity: Not on file     Other Topics Concern    Not on file     Social History Narrative    No narrative on file      Family History   Problem Relation Age of Onset    Heart attack Neg Hx     Stroke Neg Hx     Anuerysm Neg Hx     Clotting disorder Neg Hx     Hypertension Neg Hx     Hyperlipidemia Neg Hx     Arrhythmia Neg Hx     Heart failure Neg Hx     Coronary artery disease Neg Hx     Sudden death Neg Hx         scd     Past Surgical History:   Procedure Laterality Date    BODY LIFT LOWER N/A 3/2/2017    Procedure: BODY LIFT ,[place] BUTTOCK ;  Surgeon: Teri Lee MD;  [facility];  Service:     CHOLECYSTECTOMY      LAPAROSCOPY FOR ECTOPIC PREGNANCY      x 2    [place] EXCISE EXCESS SKIN TISSUE,ABDOMEN N/A 9/1/2016    Procedure: PANNICULECTOMY,  PLICATION MUSCLES ;  Surgeon: Teri Lee MD;  [facility];  Service: Plastics    [place] SUCT Julyji Lopezck N/A 9/1/2016    Procedure: LIPOSUCTION ABDOMEN ;  Surgeon: Teri Lee MD;  [facility];  Service: Plastics       Current Outpatient Prescriptions:     acetaminophen (TYLENOL) 325 mg tablet, Take 650 mg by mouth every 6 (six) hours as needed for mild pain, Disp: , Rfl:     fluticasone (FLONASE) 50 mcg/act nasal spray, 2 sprays into each nostril as needed, Disp: , Rfl:     meclizine (ANTIVERT) 12 5 MG tablet, Take 12 5 mg by mouth every 8 (eight) hours as needed  , Disp: , Rfl:     naproxen sodium (ALEVE) 220 MG tablet, Take 2 tablets (440 mg total) by mouth 2 (two) times a day with meals for 7 days, Disp: , Rfl: 0    spironolactone (ALDACTONE) 50 mg tablet, Take 50 mg by mouth daily  , Disp: , Rfl:     tretinoin (RETIN-A) 0 05 % cream, Apply 1 application topically daily at bedtime as needed, Disp: , Rfl:   Allergies   Allergen Reactions    Sertraline Rash     Other reaction(s): itchy rash, felt "loopy"       Vitals:    10/19/18 1305   BP: 124/62   BP Location: Left arm   Patient Position: Sitting   Cuff Size: Adult   Pulse: 88   SpO2: 99%   Weight: 98 7 kg (217 lb 9 6 oz)   Height: 5' 3" (1 6 m)     Vitals:    10/19/18 1305   Weight: 98 7 kg (217 lb 9 6 oz)      Height: 5' 3" (160 cm)   Body mass index is 38 55 kg/m²  Physical Exam:  GEN: Paola Bernstein appears well, alert and oriented x 3, pleasant and cooperative   HEENT: pupils equal, round, and reactive to light; extraocular muscles intact  NECK: supple, no carotid bruits   HEART: regular rhythm, normal S1 and S2, no murmurs, clicks, gallops or rubs   LUNGS: clear to auscultation bilaterally; no wheezes, rales, or rhonchi   ABDOMEN: normal bowel sounds, soft, no tenderness, no distention  EXTREMITIES: peripheral pulses normal; no clubbing, cyanosis, or edema  NEURO: no focal findings   SKIN: normal without suspicious lesions on exposed skin      ROS:  Except as noted in HPI, is otherwise reviewed in detail and a 12 point review of systems is negative      Labs:  Lab Results   Component Value Date     (L) 09/28/2018    K 4 5 09/28/2018    CL 99 (L) 09/28/2018    CREATININE 0 70 09/28/2018    BUN 13 09/28/2018    CO2 21 09/28/2018    INR 0 96 09/28/2018    GLUF 92 09/06/2013    WBC 11 06 (H) 09/28/2018    HGB 13 8 09/28/2018    HCT 40 4 09/28/2018     09/28/2018

## 2018-11-06 ENCOUNTER — TRANSCRIBE ORDERS (OUTPATIENT)
Dept: ADMINISTRATIVE | Facility: HOSPITAL | Age: 48
End: 2018-11-06

## 2018-11-06 DIAGNOSIS — R31.9 HEMATURIA SYNDROME: Primary | ICD-10-CM

## 2019-01-31 ENCOUNTER — TRANSCRIBE ORDERS (OUTPATIENT)
Dept: ADMINISTRATIVE | Facility: HOSPITAL | Age: 49
End: 2019-01-31

## 2019-01-31 DIAGNOSIS — Z12.39 SCREENING BREAST EXAMINATION: Primary | ICD-10-CM

## 2019-02-07 ENCOUNTER — HOSPITAL ENCOUNTER (OUTPATIENT)
Dept: MAMMOGRAPHY | Facility: HOSPITAL | Age: 49
Discharge: HOME | End: 2019-02-07
Payer: COMMERCIAL

## 2019-02-07 VITALS — BODY MASS INDEX: 34.21 KG/M2 | HEIGHT: 64 IN | WEIGHT: 200.38 LBS

## 2019-02-07 DIAGNOSIS — Z12.39 SCREENING BREAST EXAMINATION: ICD-10-CM

## 2019-02-07 PROCEDURE — 77067 SCR MAMMO BI INCL CAD: CPT

## 2019-02-22 ENCOUNTER — OFFICE VISIT (OUTPATIENT)
Dept: GASTROENTEROLOGY | Facility: AMBULARY SURGERY CENTER | Age: 49
End: 2019-02-22
Payer: COMMERCIAL

## 2019-02-22 VITALS
TEMPERATURE: 97.7 F | SYSTOLIC BLOOD PRESSURE: 120 MMHG | HEIGHT: 63 IN | HEART RATE: 77 BPM | BODY MASS INDEX: 36.75 KG/M2 | DIASTOLIC BLOOD PRESSURE: 72 MMHG | RESPIRATION RATE: 18 BRPM | WEIGHT: 207.4 LBS

## 2019-02-22 DIAGNOSIS — Z12.11 SCREEN FOR COLON CANCER: ICD-10-CM

## 2019-02-22 DIAGNOSIS — R10.13 EPIGASTRIC PAIN: Primary | ICD-10-CM

## 2019-02-22 PROCEDURE — 99204 OFFICE O/P NEW MOD 45 MIN: CPT | Performed by: INTERNAL MEDICINE

## 2019-02-22 RX ORDER — OMEPRAZOLE 20 MG/1
20 CAPSULE, DELAYED RELEASE ORAL DAILY
COMMUNITY
End: 2019-02-22 | Stop reason: ALTCHOICE

## 2019-02-22 RX ORDER — PANTOPRAZOLE SODIUM 40 MG/1
40 TABLET, DELAYED RELEASE ORAL DAILY
Qty: 30 TABLET | Refills: 11 | Status: SHIPPED | OUTPATIENT
Start: 2019-02-22 | End: 2019-11-07 | Stop reason: SDUPTHER

## 2019-02-22 NOTE — LETTER
February 25, 2019     Bianca Tan DO  [address] [facility]  [place] [address]  [address]    Patient: Patricia Mcdaniels   YOB: 1970   Date of Visit: 2/22/2019       Dear Dr Seth Shell: Thank you for referring Herminio Willis to me for evaluation  Below are my notes for this consultation  If you have questions, please do not hesitate to call me  I look forward to following your patient along with you  Sincerely,        Rupinder Arana MD        CC: No Recipients  Rupinder Arana MD  2/22/2019 10:34 AM  Signed  Consultation - [facility]) [facility]  Patricia Naiken 1970 female         Chief Complaint:  Abdominal pain    HPI:  79-year-old female with no significant past medical history reports having abdominal pain for about 6 months  Complaining about epigastric and left upper quadrant pain which has been episode ache  No specific relationship with meal   She tried omeprazole couple of times without any significant help  Denies any NSAID use  Good appetite, no recent weight loss  Regular bowel movements and denies any blood or mucus in the stool  Denies any heartburn acid reflux  Denies any difficulty swallowing  REVIEW OF SYSTEMS: Review of Systems   Constitutional: Negative for activity change, appetite change, chills, diaphoresis, fatigue, fever and unexpected weight change  HENT: Negative for ear discharge, ear pain, facial swelling, hearing loss, nosebleeds, sore throat, tinnitus and voice change  Eyes: Negative for pain, discharge, redness, itching and visual disturbance  Respiratory: Negative for apnea, cough, chest tightness, shortness of breath and wheezing  Cardiovascular: Negative for chest pain and palpitations  Gastrointestinal:        As noted in HPI   Endocrine: Negative for cold intolerance, heat intolerance and polyuria  Genitourinary: Negative for difficulty urinating, dysuria, flank pain, hematuria and urgency  Musculoskeletal: Negative for arthralgias, back pain, gait problem, joint swelling and myalgias  Skin: Negative for rash and wound  Neurological: Negative for dizziness, tremors, seizures, speech difficulty, light-headedness, numbness and headaches  Hematological: Negative for adenopathy  Does not bruise/bleed easily  Psychiatric/Behavioral: Negative for agitation, behavioral problems and confusion  The patient is not nervous/anxious  Past Medical History:   Diagnosis Date    Acne     Anxiety     Depression     GERD (gastroesophageal reflux disease)     Morbidly obese ([address] )     Wears glasses       Past Surgical History:   Procedure Laterality Date    BODY LIFT LOWER N/A 3/2/2017    Procedure: BODY LIFT ,[place] BUTTOCK ;  Surgeon: Rosy Call MD;  [facility];  Service:     CHOLECYSTECTOMY      LAPAROSCOPY FOR ECTOPIC PREGNANCY      x 2    [place] EXCISE EXCESS SKIN TISSUE,ABDOMEN N/A 9/1/2016    Procedure: PANNICULECTOMY,  PLICATION MUSCLES ;  Surgeon: Rosy Call MD;  [facility];  Service: Plastics    [place] SUCT Peterson Burns N/A 9/1/2016    Procedure: LIPOSUCTION ABDOMEN ;  Surgeon: Rosy Call MD;  [facility];  Service: Plastics     Social History     Socioeconomic History    Marital status: /Civil Union     Spouse name: Not on file    Number of children: Not on file    Years of education: Not on file    Highest education level: Not on file   Occupational History    Not on file   Social Needs    Financial resource strain: Not on file    Food insecurity:     Worry: Not on file     Inability: Not on file    Transportation needs:     Medical: Not on file     Non-medical: Not on file   Tobacco Use    Smoking status: Never Smoker    Smokeless tobacco: Never Used   Substance and Sexual Activity    Alcohol use: No    Drug use: Yes     Types: Marijuana     Comment: socially   stopped 12/22/19    Sexual activity: Not on file   Lifestyle    Physical activity:     Days per week: Not on file     Minutes per session: Not on file    Stress: Not on file   Relationships    Social connections:     Talks on phone: Not on file     Gets together: Not on file     Attends [employer] service: Not on file     Active member of club or organization: Not on file     Attends meetings of clubs or organizations: Not on file     Relationship status: Not on file    Intimate partner violence:     Fear of current or ex partner: Not on file     Emotionally abused: Not on file     Physically abused: Not on file     Forced sexual activity: Not on file   Other Topics Concern    Not on file   Social History Narrative    Not on file     Family History   Adopted: Yes   Problem Relation Age of Onset    Heart attack Neg Hx     Stroke Neg Hx     Anuerysm Neg Hx     Clotting disorder Neg Hx     Hypertension Neg Hx     Hyperlipidemia Neg Hx     Arrhythmia Neg Hx     Heart failure Neg Hx     Coronary artery disease Neg Hx     Sudden death Neg Hx         scd     Sertraline  Current Outpatient Medications   Medication Sig Dispense Refill    acetaminophen (TYLENOL) 325 mg tablet Take 650 mg by mouth every 6 (six) hours as needed for mild pain      meclizine (ANTIVERT) 12 5 MG tablet Take 12 5 mg by mouth every 8 (eight) hours as needed        spironolactone (ALDACTONE) 50 mg tablet Take 50 mg by mouth daily   tretinoin (RETIN-A) 0 05 % cream Apply 1 application topically daily at bedtime as needed      fluticasone (FLONASE) 50 mcg/act nasal spray 2 sprays into each nostril as needed      Na Sulfate-K Sulfate-Mg Sulf (SUPREP BOWEL PREP KIT) 17 5-3 13-1 6 GM/177ML SOLN Take 2 Bottles by mouth see administration instructions Please follow the instructions from the office 2 Bottle 0    pantoprazole (PROTONIX) 40 mg tablet Take 1 tablet (40 mg total) by mouth daily for 90 days 30 tablet 11     No current facility-administered medications for this visit          Blood pressure 120/72, pulse 77, temperature 97 7 °F (36 5 °C), temperature source Tympanic, resp  rate 18, height 5' 3" (1 6 m), weight 94 1 kg (207 lb 6 4 oz), last menstrual period 01/28/2019  PHYSICAL EXAM: Physical Exam   Constitutional: She is oriented to person, place, and time  She appears well-developed and well-nourished  HENT:   Head: Normocephalic and atraumatic  Nose: Nose normal    Mouth/Throat: Oropharynx is clear and moist    Eyes: Conjunctivae are normal  Right eye exhibits no discharge  Left eye exhibits no discharge  No scleral icterus  Neck: Neck supple  No JVD present  No tracheal deviation present  No thyromegaly present  Cardiovascular: Normal rate, regular rhythm and normal heart sounds  Exam reveals no gallop and no friction rub  No murmur heard  Pulmonary/Chest: Effort normal and breath sounds normal  No respiratory distress  She has no wheezes  She has no rales  She exhibits no tenderness  Abdominal: Soft  Bowel sounds are normal  She exhibits no distension and no mass  There is no tenderness  There is no rebound and no guarding  No hernia  Musculoskeletal: She exhibits no edema, tenderness or deformity  Lymphadenopathy:     She has no cervical adenopathy  Neurological: She is alert and oriented to person, place, and time  Skin: Skin is warm and dry  No rash noted  No erythema  Psychiatric: She has a normal mood and affect   Her behavior is normal  Thought content normal         Lab Results   Component Value Date    WBC 11 06 (H) 09/28/2018    HGB 13 8 09/28/2018    HCT 40 4 09/28/2018    MCV 90 09/28/2018     09/28/2018     Lab Results   Component Value Date    CALCIUM 8 9 09/28/2018    K 4 5 09/28/2018    CO2 21 09/28/2018    CL 99 (L) 09/28/2018    BUN 13 09/28/2018    CREATININE 0 70 09/28/2018     No results found for: ALT, AST, GGT, ALKPHOS, BILITOT  Lab Results   Component Value Date    INR 0 96 09/28/2018    INR 1 01 09/06/2013    PROTIME 12 5 09/28/2018 PROTIME 12 8 09/06/2013       Mammo Screening Bilateral W Cad    Result Date: 2/7/2019  Impression: No mammographic evidence of malignancy  ASSESSMENT/BI-RADS CATEGORY: Left: 1 - Negative Right: 1 - Negative Overall: 1 - Negative RECOMMENDATION:      - Routine Screening Mammogram in 1 year for both breasts  Workstation ID: [id number]       ASSESSMENT & PLAN:    Epigastric pain  Possible from peptic ulcer disease or gastric erosions  Rule out H pylori gastritis  -will give a trial with Protonix    -schedule for EGD    -Patient was explained about the lifestyle and dietary modifications  Advised to avoid fatty foods, chocolates, caffeine, alcohol and any other triggering foods  Avoid eating for at least 3 hours before going to bed         -Patient was explained about  the risks and benefits of the procedure  Risks including but not limited to bleeding, infection, perforation were explained in detail  Also explained about less than 100% sensitivity with the exam and other alternatives  Screen for colon cancer  Screening for colon cancer - patient is at average risk for colon cancer screening  Rule out colorectal lesions including polyps or malignancy  -will check with her insurance about the coverage and schedule for colonoscopy also

## 2019-02-22 NOTE — PROGRESS NOTES
Consultation - 126 [facility]  Nilam Pill 1970 female         Chief Complaint:  Abdominal pain    HPI:  76-year-old female with no significant past medical history reports having abdominal pain for about 6 months  Complaining about epigastric and left upper quadrant pain which has been episode ache  No specific relationship with meal   She tried omeprazole couple of times without any significant help  Denies any NSAID use  Good appetite, no recent weight loss  Regular bowel movements and denies any blood or mucus in the stool  Denies any heartburn acid reflux  Denies any difficulty swallowing  REVIEW OF SYSTEMS: Review of Systems   Constitutional: Negative for activity change, appetite change, chills, diaphoresis, fatigue, fever and unexpected weight change  HENT: Negative for ear discharge, ear pain, facial swelling, hearing loss, nosebleeds, sore throat, tinnitus and voice change  Eyes: Negative for pain, discharge, redness, itching and visual disturbance  Respiratory: Negative for apnea, cough, chest tightness, shortness of breath and wheezing  Cardiovascular: Negative for chest pain and palpitations  Gastrointestinal:        As noted in HPI   Endocrine: Negative for cold intolerance, heat intolerance and polyuria  Genitourinary: Negative for difficulty urinating, dysuria, flank pain, hematuria and urgency  Musculoskeletal: Negative for arthralgias, back pain, gait problem, joint swelling and myalgias  Skin: Negative for rash and wound  Neurological: Negative for dizziness, tremors, seizures, speech difficulty, light-headedness, numbness and headaches  Hematological: Negative for adenopathy  Does not bruise/bleed easily  Psychiatric/Behavioral: Negative for agitation, behavioral problems and confusion  The patient is not nervous/anxious           Past Medical History:   Diagnosis Date    Acne     Anxiety     Depression     GERD (gastroesophageal reflux disease)     Morbidly obese ([facility] [address] )     Wears glasses       Past Surgical History:   Procedure Laterality Date    BODY LIFT LOWER N/A 3/2/2017    Procedure: BODY LIFT ,[place] BUTTOCK ;  Surgeon: Amy Crow MD;  [facility];  Service:     CHOLECYSTECTOMY      LAPAROSCOPY FOR ECTOPIC PREGNANCY      x 2    AR EXCISE EXCESS SKIN TISSUE,ABDOMEN N/A 9/1/2016    Procedure: PANNICULECTOMY,  PLICATION MUSCLES ;  Surgeon: Amy Crow MD;  [facility];  Service: Plastics    AR SUCT Severiano Gouty N/A 9/1/2016    Procedure: LIPOSUCTION ABDOMEN ;  Surgeon: Amy Crow MD;  [facility];  Service: Plastics     Social History     Socioeconomic History    Marital status: /Civil Union     Spouse name: Not on file    Number of children: Not on file    Years of education: Not on file    Highest education level: Not on file   Occupational History    Not on file   Social Needs    Financial resource strain: Not on file    Food insecurity:     Worry: Not on file     Inability: Not on file    Transportation needs:     Medical: Not on file     Non-medical: Not on file   Tobacco Use    Smoking status: Never Smoker    Smokeless tobacco: Never Used   Substance and Sexual Activity    Alcohol use: No    Drug use: Yes     Types: Marijuana     Comment: socially   stopped 12/22/19    Sexual activity: Not on file   Lifestyle    Physical activity:     Days per week: Not on file     Minutes per session: Not on file    Stress: Not on file   Relationships    Social connections:     Talks on phone: Not on file     Gets together: Not on file     Attends [employer] service: Not on file     Active member of club or organization: Not on file     Attends meetings of clubs or organizations: Not on file     Relationship status: Not on file    Intimate partner violence:     Fear of current or ex partner: Not on file     Emotionally abused: Not on file     Physically abused: Not on file Forced sexual activity: Not on file   Other Topics Concern    Not on file   Social History Narrative    Not on file     Family History   Adopted: Yes   Problem Relation Age of Onset    Heart attack Neg Hx     Stroke Neg Hx     Anuerysm Neg Hx     Clotting disorder Neg Hx     Hypertension Neg Hx     Hyperlipidemia Neg Hx     Arrhythmia Neg Hx     Heart failure Neg Hx     Coronary artery disease Neg Hx     Sudden death Neg Hx         scd     Sertraline  Current Outpatient Medications   Medication Sig Dispense Refill    acetaminophen (TYLENOL) 325 mg tablet Take 650 mg by mouth every 6 (six) hours as needed for mild pain      meclizine (ANTIVERT) 12 5 MG tablet Take 12 5 mg by mouth every 8 (eight) hours as needed        spironolactone (ALDACTONE) 50 mg tablet Take 50 mg by mouth daily   tretinoin (RETIN-A) 0 05 % cream Apply 1 application topically daily at bedtime as needed      fluticasone (FLONASE) 50 mcg/act nasal spray 2 sprays into each nostril as needed      Na Sulfate-K Sulfate-Mg Sulf (SUPREP BOWEL PREP KIT) 17 5-3 13-1 6 GM/177ML SOLN Take 2 Bottles by mouth see administration instructions Please follow the instructions from the office 2 Bottle 0    pantoprazole (PROTONIX) 40 mg tablet Take 1 tablet (40 mg total) by mouth daily for 90 days 30 tablet 11     No current facility-administered medications for this visit  Blood pressure 120/72, pulse 77, temperature 97 7 °F (36 5 °C), temperature source Tympanic, resp  rate 18, height 5' 3" (1 6 m), weight 94 1 kg (207 lb 6 4 oz), last menstrual period 01/28/2019  PHYSICAL EXAM: Physical Exam   Constitutional: She is oriented to person, place, and time  She appears well-developed and well-nourished  HENT:   Head: Normocephalic and atraumatic  Nose: Nose normal    Mouth/Throat: Oropharynx is clear and moist    Eyes: Conjunctivae are normal  Right eye exhibits no discharge  Left eye exhibits no discharge  No scleral icterus  Neck: Neck supple  No JVD present  No tracheal deviation present  No thyromegaly present  Cardiovascular: Normal rate, regular rhythm and normal heart sounds  Exam reveals no gallop and no friction rub  No murmur heard  Pulmonary/Chest: Effort normal and breath sounds normal  No respiratory distress  She has no wheezes  She has no rales  She exhibits no tenderness  Abdominal: Soft  Bowel sounds are normal  She exhibits no distension and no mass  There is no tenderness  There is no rebound and no guarding  No hernia  Musculoskeletal: She exhibits no edema, tenderness or deformity  Lymphadenopathy:     She has no cervical adenopathy  Neurological: She is alert and oriented to person, place, and time  Skin: Skin is warm and dry  No rash noted  No erythema  Psychiatric: She has a normal mood and affect  Her behavior is normal  Thought content normal         Lab Results   Component Value Date    WBC 11 06 (H) 09/28/2018    HGB 13 8 09/28/2018    HCT 40 4 09/28/2018    MCV 90 09/28/2018     09/28/2018     Lab Results   Component Value Date    CALCIUM 8 9 09/28/2018    K 4 5 09/28/2018    CO2 21 09/28/2018    CL 99 (L) 09/28/2018    BUN 13 09/28/2018    CREATININE 0 70 09/28/2018     No results found for: ALT, AST, GGT, ALKPHOS, BILITOT  Lab Results   Component Value Date    INR 0 96 09/28/2018    INR 1 01 09/06/2013    PROTIME 12 5 09/28/2018    PROTIME 12 8 09/06/2013       Mammo Screening Bilateral W Cad    Result Date: 2/7/2019  Impression: No mammographic evidence of malignancy  ASSESSMENT/BI-RADS CATEGORY: Left: 1 - Negative Right: 1 - Negative Overall: 1 - Negative RECOMMENDATION:      - Routine Screening Mammogram in 1 year for both breasts  Workstation ID: [id number]       ASSESSMENT & PLAN:    Epigastric pain  Possible from peptic ulcer disease or gastric erosions  Rule out H pylori gastritis      -will give a trial with Protonix    -schedule for EGD    -Patient was explained about the lifestyle and dietary modifications  Advised to avoid fatty foods, chocolates, caffeine, alcohol and any other triggering foods  Avoid eating for at least 3 hours before going to bed         -Patient was explained about  the risks and benefits of the procedure  Risks including but not limited to bleeding, infection, perforation were explained in detail  Also explained about less than 100% sensitivity with the exam and other alternatives  Screen for colon cancer  Screening for colon cancer - patient is at average risk for colon cancer screening  Rule out colorectal lesions including polyps or malignancy  -will check with her insurance about the coverage and schedule for colonoscopy also

## 2019-02-22 NOTE — PATIENT INSTRUCTIONS
Patient is scheduled for an egd/colonoscopy at [facility] with dr Keara Veliz on 3/6/19  suprep instructions provided at check out  Amy Christina will call patient if her colon screening is not going to be covered by [employer] as   The patient is not 47years old  Patient also provided just egd instructions if she does not have the colon screening   Because of insurance reasons

## 2019-02-22 NOTE — ASSESSMENT & PLAN NOTE
Possible from peptic ulcer disease or gastric erosions  Rule out H pylori gastritis  -will give a trial with Protonix    -schedule for EGD    -Patient was explained about the lifestyle and dietary modifications  Advised to avoid fatty foods, chocolates, caffeine, alcohol and any other triggering foods  Avoid eating for at least 3 hours before going to bed         -Patient was explained about  the risks and benefits of the procedure  Risks including but not limited to bleeding, infection, perforation were explained in detail  Also explained about less than 100% sensitivity with the exam and other alternatives 
Screening for colon cancer - patient is at average risk for colon cancer screening  Rule out colorectal lesions including polyps or malignancy  -will check with her insurance about the coverage and schedule for colonoscopy also 
[facility]

## 2019-02-22 NOTE — H&P (VIEW-ONLY)
Consultation - [facility]  Yas Sal 1970 female         Chief Complaint:  Abdominal pain    HPI:  44-year-old female with no significant past medical history reports having abdominal pain for about 6 months  Complaining about epigastric and left upper quadrant pain which has been episode ache  No specific relationship with meal   She tried omeprazole couple of times without any significant help  Denies any NSAID use  Good appetite, no recent weight loss  Regular bowel movements and denies any blood or mucus in the stool  Denies any heartburn acid reflux  Denies any difficulty swallowing  REVIEW OF SYSTEMS: Review of Systems   Constitutional: Negative for activity change, appetite change, chills, diaphoresis, fatigue, fever and unexpected weight change  HENT: Negative for ear discharge, ear pain, facial swelling, hearing loss, nosebleeds, sore throat, tinnitus and voice change  Eyes: Negative for pain, discharge, redness, itching and visual disturbance  Respiratory: Negative for apnea, cough, chest tightness, shortness of breath and wheezing  Cardiovascular: Negative for chest pain and palpitations  Gastrointestinal:        As noted in HPI   Endocrine: Negative for cold intolerance, heat intolerance and polyuria  Genitourinary: Negative for difficulty urinating, dysuria, flank pain, hematuria and urgency  Musculoskeletal: Negative for arthralgias, back pain, gait problem, joint swelling and myalgias  Skin: Negative for rash and wound  Neurological: Negative for dizziness, tremors, seizures, speech difficulty, light-headedness, numbness and headaches  Hematological: Negative for adenopathy  Does not bruise/bleed easily  Psychiatric/Behavioral: Negative for agitation, behavioral problems and confusion  The patient is not nervous/anxious           Past Medical History:   Diagnosis Date    Acne     Anxiety     Depression     GERD (gastroesophageal reflux disease)     Morbidly obese ([facility] [address] )     Wears glasses       Past Surgical History:   Procedure Laterality Date    BODY LIFT LOWER N/A 3/2/2017    Procedure: BODY LIFT ,[place] BUTTOCK ;  Surgeon: Cathy Wetzel MD;  [facility];  Service:     CHOLECYSTECTOMY      LAPAROSCOPY FOR ECTOPIC PREGNANCY      x 2    VT EXCISE EXCESS SKIN TISSUE,ABDOMEN N/A 9/1/2016    Procedure: PANNICULECTOMY,  PLICATION MUSCLES ;  Surgeon: Cathy Wetzel MD;  [facility];  Service: Plastics    VT SUCT Chanell Mccabe N/A 9/1/2016    Procedure: LIPOSUCTION ABDOMEN ;  Surgeon: Cathy Wetzel MD;  [facility];  Service: Plastics     Social History     Socioeconomic History    Marital status: /Civil Union     Spouse name: Not on file    Number of children: Not on file    Years of education: Not on file    Highest education level: Not on file   Occupational History    Not on file   Social Needs    Financial resource strain: Not on file    Food insecurity:     Worry: Not on file     Inability: Not on file    Transportation needs:     Medical: Not on file     Non-medical: Not on file   Tobacco Use    Smoking status: Never Smoker    Smokeless tobacco: Never Used   Substance and Sexual Activity    Alcohol use: No    Drug use: Yes     Types: Marijuana     Comment: socially   stopped 12/22/19    Sexual activity: Not on file   Lifestyle    Physical activity:     Days per week: Not on file     Minutes per session: Not on file    Stress: Not on file   Relationships    Social connections:     Talks on phone: Not on file     Gets together: Not on file     Attends Christian service: Not on file     Active member of club or organization: Not on file     Attends meetings of clubs or organizations: Not on file     Relationship status: Not on file    Intimate partner violence:     Fear of current or ex partner: Not on file     Emotionally abused: Not on file     Physically abused: Not on file Forced sexual activity: Not on file   Other Topics Concern    Not on file   Social History Narrative    Not on file     Family History   Adopted: Yes   Problem Relation Age of Onset    Heart attack Neg Hx     Stroke Neg Hx     Anuerysm Neg Hx     Clotting disorder Neg Hx     Hypertension Neg Hx     Hyperlipidemia Neg Hx     Arrhythmia Neg Hx     Heart failure Neg Hx     Coronary artery disease Neg Hx     Sudden death Neg Hx         scd     Sertraline  Current Outpatient Medications   Medication Sig Dispense Refill    acetaminophen (TYLENOL) 325 mg tablet Take 650 mg by mouth every 6 (six) hours as needed for mild pain      meclizine (ANTIVERT) 12 5 MG tablet Take 12 5 mg by mouth every 8 (eight) hours as needed        spironolactone (ALDACTONE) 50 mg tablet Take 50 mg by mouth daily   tretinoin (RETIN-A) 0 05 % cream Apply 1 application topically daily at bedtime as needed      fluticasone (FLONASE) 50 mcg/act nasal spray 2 sprays into each nostril as needed      Na Sulfate-K Sulfate-Mg Sulf (SUPREP BOWEL PREP KIT) 17 5-3 13-1 6 GM/177ML SOLN Take 2 Bottles by mouth see administration instructions Please follow the instructions from the office 2 Bottle 0    pantoprazole (PROTONIX) 40 mg tablet Take 1 tablet (40 mg total) by mouth daily for 90 days 30 tablet 11     No current facility-administered medications for this visit  Blood pressure 120/72, pulse 77, temperature 97 7 °F (36 5 °C), temperature source Tympanic, resp  rate 18, height 5' 3" (1 6 m), weight 94 1 kg (207 lb 6 4 oz), last menstrual period 01/28/2019  PHYSICAL EXAM: Physical Exam   Constitutional: She is oriented to person, place, and time  She appears well-developed and well-nourished  HENT:   Head: Normocephalic and atraumatic  Nose: Nose normal    Mouth/Throat: Oropharynx is clear and moist    Eyes: Conjunctivae are normal  Right eye exhibits no discharge  Left eye exhibits no discharge  No scleral icterus  Neck: Neck supple  No JVD present  No tracheal deviation present  No thyromegaly present  Cardiovascular: Normal rate, regular rhythm and normal heart sounds  Exam reveals no gallop and no friction rub  No murmur heard  Pulmonary/Chest: Effort normal and breath sounds normal  No respiratory distress  She has no wheezes  She has no rales  She exhibits no tenderness  Abdominal: Soft  Bowel sounds are normal  She exhibits no distension and no mass  There is no tenderness  There is no rebound and no guarding  No hernia  Musculoskeletal: She exhibits no edema, tenderness or deformity  Lymphadenopathy:     She has no cervical adenopathy  Neurological: She is alert and oriented to person, place, and time  Skin: Skin is warm and dry  No rash noted  No erythema  Psychiatric: She has a normal mood and affect  Her behavior is normal  Thought content normal         Lab Results   Component Value Date    WBC 11 06 (H) 09/28/2018    HGB 13 8 09/28/2018    HCT 40 4 09/28/2018    MCV 90 09/28/2018     09/28/2018     Lab Results   Component Value Date    CALCIUM 8 9 09/28/2018    K 4 5 09/28/2018    CO2 21 09/28/2018    CL 99 (L) 09/28/2018    BUN 13 09/28/2018    CREATININE 0 70 09/28/2018     No results found for: ALT, AST, GGT, ALKPHOS, BILITOT  Lab Results   Component Value Date    INR 0 96 09/28/2018    INR 1 01 09/06/2013    PROTIME 12 5 09/28/2018    PROTIME 12 8 09/06/2013       Mammo Screening Bilateral W Cad    Result Date: 2/7/2019  Impression: No mammographic evidence of malignancy  ASSESSMENT/BI-RADS CATEGORY: Left: 1 - Negative Right: 1 - Negative Overall: 1 - Negative RECOMMENDATION:      - Routine Screening Mammogram in 1 year for both breasts  Workstation ID: [id number]       ASSESSMENT & PLAN:    Epigastric pain  Possible from peptic ulcer disease or gastric erosions  Rule out H pylori gastritis      -will give a trial with Protonix    -schedule for EGD    -Patient was explained about the lifestyle and dietary modifications  Advised to avoid fatty foods, chocolates, caffeine, alcohol and any other triggering foods  Avoid eating for at least 3 hours before going to bed         -Patient was explained about  the risks and benefits of the procedure  Risks including but not limited to bleeding, infection, perforation were explained in detail  Also explained about less than 100% sensitivity with the exam and other alternatives  Screen for colon cancer  Screening for colon cancer - patient is at average risk for colon cancer screening  Rule out colorectal lesions including polyps or malignancy  -will check with her insurance about the coverage and schedule for colonoscopy also

## 2019-02-26 ENCOUNTER — ANESTHESIA EVENT (OUTPATIENT)
Dept: PERIOP | Facility: AMBULARY SURGERY CENTER | Age: 49
End: 2019-02-26
Payer: COMMERCIAL

## 2019-03-06 ENCOUNTER — HOSPITAL ENCOUNTER (OUTPATIENT)
Facility: AMBULARY SURGERY CENTER | Age: 49
Setting detail: OUTPATIENT SURGERY
Discharge: HOME | End: 2019-03-06
Attending: INTERNAL MEDICINE | Admitting: INTERNAL MEDICINE
Payer: COMMERCIAL

## 2019-03-06 ENCOUNTER — ANESTHESIA (OUTPATIENT)
Dept: PERIOP | Facility: AMBULARY SURGERY CENTER | Age: 49
End: 2019-03-06
Payer: COMMERCIAL

## 2019-03-06 VITALS
HEART RATE: 96 BPM | HEIGHT: 64 IN | OXYGEN SATURATION: 96 % | DIASTOLIC BLOOD PRESSURE: 70 MMHG | RESPIRATION RATE: 18 BRPM | WEIGHT: 198 LBS | BODY MASS INDEX: 33.8 KG/M2 | SYSTOLIC BLOOD PRESSURE: 121 MMHG | TEMPERATURE: 97.3 F

## 2019-03-06 DIAGNOSIS — R10.13 EPIGASTRIC PAIN: ICD-10-CM

## 2019-03-06 LAB — EXT PREGNANCY TEST URINE: NEGATIVE

## 2019-03-06 PROCEDURE — 43239 EGD BIOPSY SINGLE/MULTIPLE: CPT | Performed by: INTERNAL MEDICINE

## 2019-03-06 PROCEDURE — 81025 URINE PREGNANCY TEST: CPT | Performed by: ANESTHESIOLOGY

## 2019-03-06 PROCEDURE — 88305 TISSUE EXAM BY PATHOLOGIST: CPT | Performed by: PATHOLOGY

## 2019-03-06 RX ORDER — SODIUM CHLORIDE 9 MG/ML
125 INJECTION, SOLUTION INTRAVENOUS CONTINUOUS
Status: DISCONTINUED | OUTPATIENT
Start: 2019-03-06 | End: 2019-03-06 | Stop reason: HOSPADM

## 2019-03-06 RX ORDER — PROPOFOL 10 MG/ML
INJECTION, EMULSION INTRAVENOUS AS NEEDED
Status: DISCONTINUED | OUTPATIENT
Start: 2019-03-06 | End: 2019-03-06 | Stop reason: SURG

## 2019-03-06 RX ADMIN — PROPOFOL 150 MG: 10 INJECTION, EMULSION INTRAVENOUS at 13:33

## 2019-03-06 RX ADMIN — PROPOFOL 50 MG: 10 INJECTION, EMULSION INTRAVENOUS at 13:40

## 2019-03-06 RX ADMIN — PROPOFOL 50 MG: 10 INJECTION, EMULSION INTRAVENOUS at 13:35

## 2019-03-06 RX ADMIN — PROPOFOL 50 MG: 10 INJECTION, EMULSION INTRAVENOUS at 13:37

## 2019-03-06 NOTE — ANESTHESIA PREPROCEDURE EVALUATION
Review of Systems/Medical History  Patient summary reviewed  Chart reviewed      Cardiovascular  Exercise tolerance (METS): >4,     Pulmonary  Shortness of breath,        GI/Hepatic    GERD poorly controlled,        Negative  ROS        Endo/Other    Obesity    GYN  Negative gynecology ROS          Hematology  Negative hematology ROS      Musculoskeletal  Negative musculoskeletal ROS        Neurology  Negative neurology ROS      Psychology   Negative psychology ROS              Physical Exam    Airway    Mallampati score: II  TM Distance: >3 FB  Neck ROM: full     Dental   No notable dental hx     Cardiovascular  Cardiovascular exam normal    Pulmonary  Pulmonary exam normal     Other Findings        Anesthesia Plan  ASA Score- 2     Anesthesia Type- IV sedation with anesthesia with ASA Monitors  Additional Monitors:   Airway Plan:         Plan Factors-  Patient did not smoke on day of surgery  Induction- intravenous  Postoperative Plan-     Informed Consent- Anesthetic plan and risks discussed with patient

## 2019-03-06 NOTE — ANESTHESIA POSTPROCEDURE EVALUATION
Post-Op Assessment Note    CV Status:  Stable  Pain Score: 0    Pain management: adequate     Mental Status:  Alert   Hydration Status:  Stable   PONV Controlled:  None   Airway Patency:  Patent   Post Op Vitals Reviewed: Yes      Staff: Anesthesiologist           BP      Temp     Pulse     Resp      SpO2

## 2019-03-06 NOTE — OP NOTE
ESOPHAGOGASTRODUODENOSCOPY    PROCEDURE: EGD/ Biopsy    INDICATIONS: Abdominal pain, Epigastric    POST-OP DIAGNOSIS: See the impression below    SEDATION: Monitored anesthesia care, check anesthesia records    CONSENT:  Informed consent was obtained for the procedure, including sedation after explaining the risks and benefits of the procedure  Risks including but not limited to bleeding, perforation, infection, aspiration were discussed in detail  Also explained about less than 100% sensitivity with the exam and other alternatives  PREPARATION:   EKG tracing, pulse oximetry, blood pressure were monitored throughout the procedure  Patient was identified by myself both verbally and by visual inspection of ID band  DESCRIPTION:   Patient was placed in the left lateral decubitus position and was sedated with the above medication  The gastroscope was introduced in to the oropharynx and the esophagus was intubated under direct visualization  Scope was passed down the esophagus up to 2nd part of the duodenum  A careful inspection was made as the gastroscope was withdrawn, including a retroflexed view of the stomach; findings and interventions are described below  FINDINGS:    #1  Esophagus and GEJ- 1-2 cm sliding hernia was noted with couple of tiny columnar mucosal extensions status post biopsies    #2  Stomach- mild erythema in the antrum  Biopsies done to check for H pylori    #3  Duodenum- normal         IMPRESSIONS:      As above    RECOMMENDATIONS:     Check pathology    Continue Protonix    COMPLICATIONS:  None; patient tolerated the procedure well            DISPOSITION: PACU           CONDITION: Stable

## 2019-03-11 ENCOUNTER — TELEPHONE (OUTPATIENT)
Dept: GASTROENTEROLOGY | Facility: CLINIC | Age: 49
End: 2019-03-11

## 2019-03-12 ENCOUNTER — HOSPITAL ENCOUNTER (OUTPATIENT)
Dept: CT IMAGING | Facility: HOSPITAL | Age: 49
Discharge: HOME | End: 2019-03-12

## 2019-03-12 DIAGNOSIS — R31.9 HEMATURIA SYNDROME: ICD-10-CM

## 2019-03-13 ENCOUNTER — TELEPHONE (OUTPATIENT)
Dept: GASTROENTEROLOGY | Facility: AMBULARY SURGERY CENTER | Age: 49
End: 2019-03-13

## 2019-03-13 DIAGNOSIS — Z32.01 PREGNANCY TEST POSITIVE: Primary | ICD-10-CM

## 2019-03-13 NOTE — TELEPHONE ENCOUNTER
Patient called this am   Maximo Cai for CT yesterday and was denied due to Positive pregnancy test on chart,  taken before EGD  She says it was negative and wants Dr Beatrice Grant to remove from chart so she can go forward with CT that her pcp ordered  Please can you call her today to advise her what can be do    [phone]

## 2019-03-13 NOTE — TELEPHONE ENCOUNTER
Faxed Script over to [phone] as requested    Fax cover in patients chart under media,    Faxed to [phone]

## 2019-03-13 NOTE — TELEPHONE ENCOUNTER
As we discussed on the phone  Patient will need to have an hcg blood test done which she typically goes to [facility] for  Please fax this to the [address] on [address] [place], [place], phone number is [phone]  After discussion with the lab, it appears that the patient's PCT was inaccurately recorded in her chart by the nurse in GI  The lab does not do the POCT  We will be filing a patient safety report in regards to this  Patient will need to have a repeat pregnancy test done (blood work recommended by the lab) to confirm negative pregnancy prior to having her CT done  She is aware and agreeable

## 2019-06-20 ENCOUNTER — ANESTHESIA EVENT (OUTPATIENT)
Dept: PERIOP | Facility: HOSPITAL | Age: 49
End: 2019-06-20
Payer: SELF-PAY

## 2019-06-21 ENCOUNTER — HOSPITAL ENCOUNTER (OUTPATIENT)
Facility: HOSPITAL | Age: 49
Setting detail: OUTPATIENT SURGERY
Discharge: HOME | End: 2019-06-22
Attending: PLASTIC SURGERY | Admitting: PLASTIC SURGERY
Payer: SELF-PAY

## 2019-06-21 ENCOUNTER — ANESTHESIA (OUTPATIENT)
Dept: PERIOP | Facility: HOSPITAL | Age: 49
End: 2019-06-21
Payer: SELF-PAY

## 2019-06-21 LAB
EXT PREGNANCY TEST URINE: NEGATIVE
EXT. CONTROL: NORMAL

## 2019-06-21 PROCEDURE — 81025 URINE PREGNANCY TEST: CPT | Performed by: ANESTHESIOLOGY

## 2019-06-21 RX ORDER — PROMETHAZINE HYDROCHLORIDE 25 MG/ML
12.5 INJECTION, SOLUTION INTRAMUSCULAR; INTRAVENOUS ONCE
Status: COMPLETED | OUTPATIENT
Start: 2019-06-21 | End: 2019-06-21

## 2019-06-21 RX ORDER — DEXAMETHASONE SODIUM PHOSPHATE 10 MG/ML
INJECTION, SOLUTION INTRAMUSCULAR; INTRAVENOUS AS NEEDED
Status: DISCONTINUED | OUTPATIENT
Start: 2019-06-21 | End: 2019-06-21 | Stop reason: SURG

## 2019-06-21 RX ORDER — PROMETHAZINE HYDROCHLORIDE 25 MG/1
25 TABLET ORAL EVERY 6 HOURS PRN
Status: DISCONTINUED | OUTPATIENT
Start: 2019-06-21 | End: 2019-06-22 | Stop reason: HOSPADM

## 2019-06-21 RX ORDER — ONDANSETRON 2 MG/ML
INJECTION INTRAMUSCULAR; INTRAVENOUS AS NEEDED
Status: DISCONTINUED | OUTPATIENT
Start: 2019-06-21 | End: 2019-06-21 | Stop reason: SURG

## 2019-06-21 RX ORDER — FENTANYL CITRATE/PF 50 MCG/ML
50 SYRINGE (ML) INJECTION
Status: COMPLETED | OUTPATIENT
Start: 2019-06-21 | End: 2019-06-21

## 2019-06-21 RX ORDER — ONDANSETRON 2 MG/ML
4 INJECTION INTRAMUSCULAR; INTRAVENOUS ONCE AS NEEDED
Status: DISCONTINUED | OUTPATIENT
Start: 2019-06-21 | End: 2019-06-21 | Stop reason: HOSPADM

## 2019-06-21 RX ORDER — ONDANSETRON 2 MG/ML
4 INJECTION INTRAMUSCULAR; INTRAVENOUS EVERY 6 HOURS PRN
Status: DISCONTINUED | OUTPATIENT
Start: 2019-06-21 | End: 2019-06-22 | Stop reason: HOSPADM

## 2019-06-21 RX ORDER — DOCUSATE SODIUM 100 MG/1
100 CAPSULE, LIQUID FILLED ORAL 2 TIMES DAILY
Status: DISCONTINUED | OUTPATIENT
Start: 2019-06-21 | End: 2019-06-22 | Stop reason: HOSPADM

## 2019-06-21 RX ORDER — ONDANSETRON 2 MG/ML
4 INJECTION INTRAMUSCULAR; INTRAVENOUS EVERY 8 HOURS PRN
Status: DISCONTINUED | OUTPATIENT
Start: 2019-06-21 | End: 2019-06-21

## 2019-06-21 RX ORDER — MORPHINE SULFATE 4 MG/ML
3 INJECTION, SOLUTION INTRAMUSCULAR; INTRAVENOUS
Status: DISCONTINUED | OUTPATIENT
Start: 2019-06-21 | End: 2019-06-22 | Stop reason: HOSPADM

## 2019-06-21 RX ORDER — VECURONIUM BROMIDE 1 MG/ML
INJECTION, POWDER, LYOPHILIZED, FOR SOLUTION INTRAVENOUS AS NEEDED
Status: DISCONTINUED | OUTPATIENT
Start: 2019-06-21 | End: 2019-06-21 | Stop reason: SURG

## 2019-06-21 RX ORDER — SPIRONOLACTONE 50 MG/1
50 TABLET, FILM COATED ORAL DAILY
Status: DISCONTINUED | OUTPATIENT
Start: 2019-06-22 | End: 2019-06-22 | Stop reason: HOSPADM

## 2019-06-21 RX ORDER — SCOLOPAMINE TRANSDERMAL SYSTEM 1 MG/1
1 PATCH, EXTENDED RELEASE TRANSDERMAL ONCE AS NEEDED
Status: DISCONTINUED | OUTPATIENT
Start: 2019-06-21 | End: 2019-06-22 | Stop reason: HOSPADM

## 2019-06-21 RX ORDER — NEOSTIGMINE METHYLSULFATE 1 MG/ML
INJECTION INTRAVENOUS AS NEEDED
Status: DISCONTINUED | OUTPATIENT
Start: 2019-06-21 | End: 2019-06-21 | Stop reason: SURG

## 2019-06-21 RX ORDER — BUPIVACAINE HYDROCHLORIDE AND EPINEPHRINE 2.5; 5 MG/ML; UG/ML
INJECTION, SOLUTION EPIDURAL; INFILTRATION; INTRACAUDAL; PERINEURAL AS NEEDED
Status: DISCONTINUED | OUTPATIENT
Start: 2019-06-21 | End: 2019-06-21 | Stop reason: HOSPADM

## 2019-06-21 RX ORDER — LIDOCAINE HYDROCHLORIDE 10 MG/ML
INJECTION, SOLUTION INFILTRATION; PERINEURAL AS NEEDED
Status: DISCONTINUED | OUTPATIENT
Start: 2019-06-21 | End: 2019-06-21 | Stop reason: SURG

## 2019-06-21 RX ORDER — PROPOFOL 10 MG/ML
INJECTION, EMULSION INTRAVENOUS AS NEEDED
Status: DISCONTINUED | OUTPATIENT
Start: 2019-06-21 | End: 2019-06-21 | Stop reason: SURG

## 2019-06-21 RX ORDER — LIDOCAINE HYDROCHLORIDE AND EPINEPHRINE 10; 10 MG/ML; UG/ML
INJECTION, SOLUTION INFILTRATION; PERINEURAL AS NEEDED
Status: DISCONTINUED | OUTPATIENT
Start: 2019-06-21 | End: 2019-06-21 | Stop reason: HOSPADM

## 2019-06-21 RX ORDER — MAGNESIUM HYDROXIDE 1200 MG/15ML
LIQUID ORAL AS NEEDED
Status: DISCONTINUED | OUTPATIENT
Start: 2019-06-21 | End: 2019-06-21 | Stop reason: HOSPADM

## 2019-06-21 RX ORDER — METOCLOPRAMIDE HYDROCHLORIDE 5 MG/ML
10 INJECTION INTRAMUSCULAR; INTRAVENOUS ONCE AS NEEDED
Status: DISCONTINUED | OUTPATIENT
Start: 2019-06-21 | End: 2019-06-22 | Stop reason: HOSPADM

## 2019-06-21 RX ORDER — HYDROMORPHONE HCL/PF 1 MG/ML
0.5 SYRINGE (ML) INJECTION
Status: DISCONTINUED | OUTPATIENT
Start: 2019-06-21 | End: 2019-06-21 | Stop reason: HOSPADM

## 2019-06-21 RX ORDER — OXYCODONE HYDROCHLORIDE AND ACETAMINOPHEN 5; 325 MG/1; MG/1
2 TABLET ORAL EVERY 4 HOURS PRN
Status: DISCONTINUED | OUTPATIENT
Start: 2019-06-21 | End: 2019-06-22 | Stop reason: SDUPTHER

## 2019-06-21 RX ORDER — FENTANYL CITRATE 50 UG/ML
INJECTION, SOLUTION INTRAMUSCULAR; INTRAVENOUS AS NEEDED
Status: DISCONTINUED | OUTPATIENT
Start: 2019-06-21 | End: 2019-06-21 | Stop reason: SURG

## 2019-06-21 RX ORDER — MIDAZOLAM HYDROCHLORIDE 1 MG/ML
INJECTION INTRAMUSCULAR; INTRAVENOUS AS NEEDED
Status: DISCONTINUED | OUTPATIENT
Start: 2019-06-21 | End: 2019-06-21 | Stop reason: SURG

## 2019-06-21 RX ORDER — HYDROMORPHONE HYDROCHLORIDE 2 MG/ML
INJECTION, SOLUTION INTRAMUSCULAR; INTRAVENOUS; SUBCUTANEOUS AS NEEDED
Status: DISCONTINUED | OUTPATIENT
Start: 2019-06-21 | End: 2019-06-21 | Stop reason: SURG

## 2019-06-21 RX ORDER — LORAZEPAM 2 MG/ML
0.5 INJECTION INTRAMUSCULAR
Status: DISCONTINUED | OUTPATIENT
Start: 2019-06-21 | End: 2019-06-21 | Stop reason: HOSPADM

## 2019-06-21 RX ORDER — OXYCODONE HYDROCHLORIDE AND ACETAMINOPHEN 5; 325 MG/1; MG/1
2 TABLET ORAL EVERY 4 HOURS PRN
Status: DISCONTINUED | OUTPATIENT
Start: 2019-06-21 | End: 2019-06-22 | Stop reason: HOSPADM

## 2019-06-21 RX ORDER — SODIUM CHLORIDE 9 MG/ML
125 INJECTION, SOLUTION INTRAVENOUS CONTINUOUS
Status: DISCONTINUED | OUTPATIENT
Start: 2019-06-21 | End: 2019-06-22 | Stop reason: HOSPADM

## 2019-06-21 RX ORDER — CEFAZOLIN SODIUM 2 G/50ML
2000 SOLUTION INTRAVENOUS
Status: DISCONTINUED | OUTPATIENT
Start: 2019-06-21 | End: 2019-06-21 | Stop reason: HOSPADM

## 2019-06-21 RX ORDER — SODIUM CHLORIDE, SODIUM LACTATE, POTASSIUM CHLORIDE, CALCIUM CHLORIDE 600; 310; 30; 20 MG/100ML; MG/100ML; MG/100ML; MG/100ML
125 INJECTION, SOLUTION INTRAVENOUS CONTINUOUS
Status: DISCONTINUED | OUTPATIENT
Start: 2019-06-21 | End: 2019-06-22 | Stop reason: HOSPADM

## 2019-06-21 RX ORDER — CEFAZOLIN SODIUM 2 G/50ML
2000 SOLUTION INTRAVENOUS EVERY 8 HOURS
Status: DISCONTINUED | OUTPATIENT
Start: 2019-06-22 | End: 2019-06-22 | Stop reason: HOSPADM

## 2019-06-21 RX ORDER — GLYCOPYRROLATE 0.2 MG/ML
INJECTION INTRAMUSCULAR; INTRAVENOUS AS NEEDED
Status: DISCONTINUED | OUTPATIENT
Start: 2019-06-21 | End: 2019-06-21 | Stop reason: SURG

## 2019-06-21 RX ADMIN — HYDROMORPHONE HYDROCHLORIDE 0.5 MG: 1 INJECTION, SOLUTION INTRAMUSCULAR; INTRAVENOUS; SUBCUTANEOUS at 18:35

## 2019-06-21 RX ADMIN — HYDROMORPHONE HYDROCHLORIDE 0.6 MG: 2 INJECTION, SOLUTION INTRAMUSCULAR; INTRAVENOUS; SUBCUTANEOUS at 13:17

## 2019-06-21 RX ADMIN — ONDANSETRON HYDROCHLORIDE 4 MG: 2 INJECTION, SOLUTION INTRAVENOUS at 13:02

## 2019-06-21 RX ADMIN — SODIUM CHLORIDE 125 ML/HR: 0.9 INJECTION, SOLUTION INTRAVENOUS at 10:28

## 2019-06-21 RX ADMIN — VECURONIUM BROMIDE 3 MG: 1 INJECTION, POWDER, LYOPHILIZED, FOR SOLUTION INTRAVENOUS at 15:22

## 2019-06-21 RX ADMIN — SODIUM CHLORIDE, SODIUM LACTATE, POTASSIUM CHLORIDE, AND CALCIUM CHLORIDE 125 ML/HR: .6; .31; .03; .02 INJECTION, SOLUTION INTRAVENOUS at 22:42

## 2019-06-21 RX ADMIN — FENTANYL CITRATE 50 MCG: 50 INJECTION, SOLUTION INTRAMUSCULAR; INTRAVENOUS at 13:14

## 2019-06-21 RX ADMIN — CEFAZOLIN SODIUM 2000 MG: 2 SOLUTION INTRAVENOUS at 12:36

## 2019-06-21 RX ADMIN — SODIUM CHLORIDE: 0.9 INJECTION, SOLUTION INTRAVENOUS at 17:30

## 2019-06-21 RX ADMIN — MIDAZOLAM 2 MG: 1 INJECTION INTRAMUSCULAR; INTRAVENOUS at 12:36

## 2019-06-21 RX ADMIN — SODIUM CHLORIDE: 0.9 INJECTION, SOLUTION INTRAVENOUS at 14:40

## 2019-06-21 RX ADMIN — ENOXAPARIN SODIUM 40 MG: 40 INJECTION SUBCUTANEOUS at 12:32

## 2019-06-21 RX ADMIN — CEFAZOLIN SODIUM 2000 MG: 2 SOLUTION INTRAVENOUS at 16:24

## 2019-06-21 RX ADMIN — FENTANYL CITRATE 50 MCG: 50 INJECTION, SOLUTION INTRAMUSCULAR; INTRAVENOUS at 15:40

## 2019-06-21 RX ADMIN — HYDROMORPHONE HYDROCHLORIDE 0.4 MG: 2 INJECTION, SOLUTION INTRAMUSCULAR; INTRAVENOUS; SUBCUTANEOUS at 14:40

## 2019-06-21 RX ADMIN — DOCUSATE SODIUM 100 MG: 100 CAPSULE, LIQUID FILLED ORAL at 22:48

## 2019-06-21 RX ADMIN — VECURONIUM BROMIDE 7 MG: 1 INJECTION, POWDER, LYOPHILIZED, FOR SOLUTION INTRAVENOUS at 12:42

## 2019-06-21 RX ADMIN — FENTANYL CITRATE 50 MCG: 50 INJECTION INTRAMUSCULAR; INTRAVENOUS at 18:18

## 2019-06-21 RX ADMIN — GLYCOPYRROLATE 0.4 MG: 0.2 INJECTION INTRAMUSCULAR; INTRAVENOUS at 16:14

## 2019-06-21 RX ADMIN — SCOPALAMINE 1 PATCH: 1 PATCH, EXTENDED RELEASE TRANSDERMAL at 10:18

## 2019-06-21 RX ADMIN — HYDROMORPHONE HYDROCHLORIDE 1 MG: 2 INJECTION, SOLUTION INTRAMUSCULAR; INTRAVENOUS; SUBCUTANEOUS at 13:11

## 2019-06-21 RX ADMIN — HYDROMORPHONE HYDROCHLORIDE 0.5 MG: 1 INJECTION, SOLUTION INTRAMUSCULAR; INTRAVENOUS; SUBCUTANEOUS at 18:45

## 2019-06-21 RX ADMIN — LIDOCAINE HYDROCHLORIDE 60 MG: 10 INJECTION, SOLUTION INFILTRATION; PERINEURAL at 12:42

## 2019-06-21 RX ADMIN — SODIUM CHLORIDE: 0.9 INJECTION, SOLUTION INTRAVENOUS at 13:10

## 2019-06-21 RX ADMIN — LORAZEPAM 0.5 MG: 2 INJECTION INTRAMUSCULAR; INTRAVENOUS at 18:55

## 2019-06-21 RX ADMIN — VECURONIUM BROMIDE 3 MG: 1 INJECTION, POWDER, LYOPHILIZED, FOR SOLUTION INTRAVENOUS at 14:21

## 2019-06-21 RX ADMIN — PROMETHAZINE HYDROCHLORIDE 12.5 MG: 25 INJECTION INTRAMUSCULAR; INTRAVENOUS at 12:42

## 2019-06-21 RX ADMIN — PROPOFOL 200 MG: 10 INJECTION, EMULSION INTRAVENOUS at 12:42

## 2019-06-21 RX ADMIN — NEOSTIGMINE METHYLSULFATE 3 MG: 1 INJECTION, SOLUTION INTRAVENOUS at 16:14

## 2019-06-21 RX ADMIN — HYDROMORPHONE HYDROCHLORIDE 0.5 MG: 1 INJECTION, SOLUTION INTRAMUSCULAR; INTRAVENOUS; SUBCUTANEOUS at 19:18

## 2019-06-21 RX ADMIN — FENTANYL CITRATE 100 MCG: 50 INJECTION, SOLUTION INTRAMUSCULAR; INTRAVENOUS at 12:42

## 2019-06-21 RX ADMIN — VECURONIUM BROMIDE 3 MG: 1 INJECTION, POWDER, LYOPHILIZED, FOR SOLUTION INTRAVENOUS at 13:17

## 2019-06-21 RX ADMIN — DEXAMETHASONE SODIUM PHOSPHATE 8 MG: 10 INJECTION, SOLUTION INTRAMUSCULAR; INTRAVENOUS at 13:02

## 2019-06-21 RX ADMIN — HYDROMORPHONE HYDROCHLORIDE 0.5 MG: 1 INJECTION, SOLUTION INTRAMUSCULAR; INTRAVENOUS; SUBCUTANEOUS at 19:04

## 2019-06-21 RX ADMIN — ONDANSETRON HYDROCHLORIDE 4 MG: 2 INJECTION, SOLUTION INTRAVENOUS at 16:15

## 2019-06-21 RX ADMIN — FENTANYL CITRATE 50 MCG: 50 INJECTION INTRAMUSCULAR; INTRAVENOUS at 18:28

## 2019-06-22 VITALS
DIASTOLIC BLOOD PRESSURE: 71 MMHG | HEIGHT: 64 IN | RESPIRATION RATE: 18 BRPM | SYSTOLIC BLOOD PRESSURE: 106 MMHG | BODY MASS INDEX: 33.63 KG/M2 | OXYGEN SATURATION: 98 % | TEMPERATURE: 97.5 F | WEIGHT: 197 LBS | HEART RATE: 90 BPM

## 2019-06-22 RX ADMIN — MORPHINE SULFATE 3 MG: 4 INJECTION INTRAVENOUS at 00:15

## 2019-06-22 RX ADMIN — ENOXAPARIN SODIUM 40 MG: 40 INJECTION SUBCUTANEOUS at 12:48

## 2019-06-22 RX ADMIN — CEFAZOLIN SODIUM 2000 MG: 2 SOLUTION INTRAVENOUS at 00:11

## 2019-06-22 RX ADMIN — OXYCODONE HYDROCHLORIDE AND ACETAMINOPHEN 2 TABLET: 5; 325 TABLET ORAL at 08:37

## 2019-06-22 RX ADMIN — DOCUSATE SODIUM 100 MG: 100 CAPSULE, LIQUID FILLED ORAL at 08:37

## 2019-06-22 RX ADMIN — OXYCODONE HYDROCHLORIDE AND ACETAMINOPHEN 2 TABLET: 5; 325 TABLET ORAL at 04:23

## 2019-06-22 RX ADMIN — CEFAZOLIN SODIUM 2000 MG: 2 SOLUTION INTRAVENOUS at 08:39

## 2019-06-22 RX ADMIN — OXYCODONE HYDROCHLORIDE AND ACETAMINOPHEN 2 TABLET: 5; 325 TABLET ORAL at 12:47

## 2019-09-10 DIAGNOSIS — Z12.11 COLON CANCER SCREENING: Primary | ICD-10-CM

## 2019-09-17 ENCOUNTER — OFFICE VISIT (OUTPATIENT)
Dept: GASTROENTEROLOGY | Facility: AMBULARY SURGERY CENTER | Age: 49
End: 2019-09-17
Payer: COMMERCIAL

## 2019-09-17 ENCOUNTER — TELEPHONE (OUTPATIENT)
Dept: GASTROENTEROLOGY | Facility: CLINIC | Age: 49
End: 2019-09-17

## 2019-09-17 VITALS
HEIGHT: 64 IN | WEIGHT: 205.8 LBS | BODY MASS INDEX: 35.13 KG/M2 | TEMPERATURE: 98.9 F | SYSTOLIC BLOOD PRESSURE: 120 MMHG | HEART RATE: 80 BPM | DIASTOLIC BLOOD PRESSURE: 70 MMHG | RESPIRATION RATE: 16 BRPM

## 2019-09-17 DIAGNOSIS — R10.13 EPIGASTRIC PAIN: Primary | ICD-10-CM

## 2019-09-17 DIAGNOSIS — K22.70 BARRETT'S ESOPHAGUS WITHOUT DYSPLASIA: ICD-10-CM

## 2019-09-17 DIAGNOSIS — K58.9 SPASM OF BOWEL: ICD-10-CM

## 2019-09-17 PROCEDURE — 99214 OFFICE O/P EST MOD 30 MIN: CPT | Performed by: PHYSICIAN ASSISTANT

## 2019-09-17 RX ORDER — PANTOPRAZOLE SODIUM 40 MG/1
40 TABLET, DELAYED RELEASE ORAL DAILY
Refills: 11 | COMMUNITY
Start: 2019-08-27 | End: 2020-06-09 | Stop reason: SDUPTHER

## 2019-09-17 RX ORDER — DICYCLOMINE HYDROCHLORIDE 10 MG/1
10 CAPSULE ORAL 3 TIMES DAILY PRN
Qty: 90 CAPSULE | Refills: 0 | Status: SHIPPED | OUTPATIENT
Start: 2019-09-17 | End: 2019-11-07 | Stop reason: SDUPTHER

## 2019-09-17 NOTE — ASSESSMENT & PLAN NOTE
I suspect this to be musculoskeletal or otherwise non-GI related; there is noted to be some point tenderness over the area of the xiphoid process, she also does not seem to correlate her symptomatology with food intake or bowel movements    Could still potentially represent bowel spasm, doubt for biliary process as she is status post cholecystectomy but consider biliary stones or sludge    - I sent short prescription of Bentyl to be taken as needed for the abdominal pain, may continue if this does help    - I did recommend patient to discuss with her primary care provider regarding potential non-GI related causes of her pain, she can also try topical treatments such as warm heating pad for 20 minutes twice a day, can also use anti-inflammatory medications over-the-counter in moderation and with food    - I did advise patient about avoiding gas producing foods also, as these can provoke bowel spasm    - Continue PPI; 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

## 2019-09-17 NOTE — PROGRESS NOTES
Follow-up Note -  [facility]  Cande Bolivar 1970 51 y o  female         Reason:  Follow-up; epigastric/left upper quadrant pain    HPI:  69-year-old female with history of anxiety, depression, vertigo who presented to the office for follow-up; she was last seen in our office in February for evaluation of abdominal pain of approximately 6 months duration, this had been episodic and mostly in the epigastric and left upper quadrant regions  She was scheduled for EGD; EGD showed 1-2 cm sliding hiatal hernia with a couple of tiny columnar mucosal extensions which were biopsied  Biopsies were positive for small segment of Alvarez's esophagus, she was recommended for repeat EGD in 2 years  She had an appointment for this October, but called our office for earlier appointment because She was experiencing more issues with intermittent epigastric and left upper quadrant pain  She describes as a pressure-like sensation, is not always correlated with food intake or bowel movements, she says that she sometimes wakes up with you  It can last anywhere from a few minutes to a few days  She says that it did not change at all after she started taking acid reducers  She had cholecystectomy in the 1990s  She says it is tender to touch  Denies any alcohol use or tobacco use  Denies any unexpected weight changes  She is already scheduled for colonoscopy in November  REVIEW OF SYSTEMS:      CONSTITUTIONAL: Denies any fever, chills, or rigors  Good appetite, and no recent weight loss  HEENT: No earache or tinnitus  Denies hearing loss or visual disturbances  CARDIOVASCULAR: No chest pain or palpitations  RESPIRATORY: Denies any cough, hemoptysis, shortness of breath or dyspnea on exertion  GASTROINTESTINAL: As noted in the History of Present Illness  GENITOURINARY: No problems with urination  Denies any hematuria or dysuria  NEUROLOGIC: No dizziness or vertigo, denies headaches  MUSCULOSKELETAL: Denies any muscle or joint pain  SKIN: Denies skin rashes or itching  ENDOCRINE: Denies excessive thirst  Denies intolerance to heat or cold  PSYCHOSOCIAL: Denies depression or anxiety  Denies any recent memory loss  Past Medical History:   Diagnosis Date    Acne     Anxiety     Depression     GERD (gastroesophageal reflux disease)     Morbidly obese (HCC)     PONV (postoperative nausea and vomiting)     Nausea    Vertigo     Wears glasses       Past Surgical History:   Procedure Laterality Date    BODY LIFT LOWER N/A 3/2/2017    Procedure: BODY LIFT ,[place]ian BUTTOCK ;  Surgeon: Kain Beck MD;  [facility];  Service:     CHOLECYSTECTOMY      Laparoscopic    DILATION AND CURETTAGE OF UTERUS      LAPAROSCOPY FOR ECTOPIC PREGNANCY      x 2    LIPOSUCTION N/A 6/21/2019    Procedure: FLANKS/THIGHS LIPOSUCTION;  Surgeon: Kain Beck MD;  [facility];  Service: Plastics    NE ESOPHAGOGASTRODUODENOSCOPY TRANSORAL DIAGNOSTIC N/A 3/6/2019    Procedure: ESOPHAGOGASTRODUODENOSCOPY (EGD); Surgeon: Irwin Mccollum MD;  [facility];   Service: Gastroenterology    NE EXCISE EXCESS SKIN TISSUE,ABDOMEN N/A 9/1/2016    Procedure: PANNICULECTOMY,  PLICATION MUSCLES ;  Surgeon: Kain Beck MD;  [facility];  Service: Plastics    NE BUZZ [place] N/A 9/1/2016    Procedure: LIPOSUCTION ABDOMEN ;  Surgeon: Kain Beck MD;  [facility];  Service: Plastics    NE SUSPENSION OF BREAST Bilateral 6/21/2019    Procedure: MASTOPEXY WITH FAT GRAFTING;  Surgeon: Kain Beck MD;  [facility];  Service: Plastics    WISDOM TOOTH EXTRACTION       Social History     Socioeconomic History    Marital status: /Civil Union     Spouse name: Not on file    Number of children: Not on file    Years of education: Not on file    Highest education level: Not on file   Occupational History    Not on file   Social Needs    Financial resource strain: Not on file    Food insecurity:     Worry: Not on file     Inability: Not on file    Transportation needs:     Medical: Not on file     Non-medical: Not on file   Tobacco Use    Smoking status: Never Smoker    Smokeless tobacco: Never Used   Substance and Sexual Activity    Alcohol use: No    Drug use: Yes     Types: Marijuana     Comment: socially  stopped 12/21/19    Sexual activity: Not on file   Lifestyle    Physical activity:     Days per week: Not on file     Minutes per session: Not on file    Stress: Not on file   Relationships    Social connections:     Talks on phone: Not on file     Gets together: Not on file     Attends Zoroastrian service: Not on file     Active member of club or organization: Not on file     Attends meetings of clubs or organizations: Not on file     Relationship status: Not on file    Intimate partner violence:     Fear of current or ex partner: Not on file     Emotionally abused: Not on file     Physically abused: Not on file     Forced sexual activity: Not on file   Other Topics Concern    Not on file   Social History Narrative    Not on file     Family History   Adopted: Yes   Problem Relation Age of Onset    Heart attack Neg Hx     Stroke Neg Hx     Anuerysm Neg Hx     Clotting disorder Neg Hx     Hypertension Neg Hx     Hyperlipidemia Neg Hx     Arrhythmia Neg Hx     Heart failure Neg Hx     Coronary artery disease Neg Hx     Sudden death Neg Hx         scd     Sertraline  Current Outpatient Medications   Medication Sig Dispense Refill    BIOTIN PO Take 1 tablet by mouth daily       fluticasone (FLONASE) 50 mcg/act nasal spray 2 sprays into each nostril as needed      meclizine (ANTIVERT) 12 5 MG tablet Take 12 5 mg by mouth every 8 (eight) hours as needed        pantoprazole (PROTONIX) 40 mg tablet Take 40 mg by mouth daily  11    spironolactone (ALDACTONE) 50 mg tablet Take 50 mg by mouth daily        acetaminophen (TYLENOL) 325 mg tablet Take 650 mg by mouth every 6 (six) hours as needed for mild pain      dicyclomine (BENTYL) 10 mg capsule Take 1 capsule (10 mg total) by mouth 3 (three) times a day as needed (abdominal cramping) 90 capsule 0    pantoprazole (PROTONIX) 40 mg tablet Take 1 tablet (40 mg total) by mouth daily for 90 days 30 tablet 11    polyethylene glycol (GOLYTELY) 4000 mL solution Take 4,000 mL by mouth once for 1 dose Take as directed by the office  4000 mL 0    tretinoin (RETIN-A) 0 05 % cream Apply 1 application topically daily at bedtime as needed       No current facility-administered medications for this visit  Blood pressure 120/70, pulse 80, temperature 98 9 °F (37 2 °C), temperature source Tympanic, resp  rate 16, height 5' 4" (1 626 m), weight 93 4 kg (205 lb 12 8 oz)  PHYSICAL EXAM:      General Appearance:   Alert, cooperative, no distress, appears stated age    HEENT:   Normocephalic, atraumatic, anicteric      Neck:  Supple, symmetrical, trachea midline, no adenopathy;    thyroid: no enlargement/tenderness/nodules; no carotid  bruit or JVD    Lungs:   Clear to auscultation bilaterally; no rales, rhonchi or wheezing; respirations unlabored    Heart[de-identified]   S1 and S2 normal; regular rate and rhythm; no murmur, rub, or gallop     Abdomen:   Soft, There is some point tenderness over the area of the xiphoid process but the rest of the abdomen is soft and nontender, non-distended; normal bowel sounds; no masses, no organomegaly    Extremities: No edema, erythema, wounds, rashes   Rectal:   Deferred                      Lab Results   Component Value Date    WBC 11 06 (H) 09/28/2018    HGB 13 8 09/28/2018    HCT 40 4 09/28/2018    MCV 90 09/28/2018     09/28/2018     Lab Results   Component Value Date    CALCIUM 8 9 09/28/2018    K 4 5 09/28/2018    CO2 21 09/28/2018    CL 99 (L) 09/28/2018    BUN 13 09/28/2018    CREATININE 0 70 09/28/2018     No results found for: ALT, AST, GGT, ALKPHOS, BILITOT  Lab Results Component Value Date    INR 0 96 09/28/2018    INR 1 01 09/06/2013    PROTIME 12 5 09/28/2018    PROTIME 12 8 09/06/2013       No results found  ASSESSMENT & PLAN:    Spasm of bowel  See "epigastric pain"    Epigastric pain  I suspect this to be musculoskeletal or otherwise non-GI related; there is noted to be some point tenderness over the area of the xiphoid process, she also does not seem to correlate her symptomatology with food intake or bowel movements  Could still potentially represent bowel spasm, doubt for biliary process as she is status post cholecystectomy but consider biliary stones or sludge    - I sent short prescription of Bentyl to be taken as needed for the abdominal pain, may continue if this does help    - I did recommend patient to discuss with her primary care provider regarding potential non-GI related causes of her pain, she can also try topical treatments such as warm heating pad for 20 minutes twice a day, can also use anti-inflammatory medications over-the-counter in moderation and with food    - I did advise patient about avoiding gas producing foods also, as these can provoke bowel spasm    - Continue PPI; she does have Alvarez's esophagus and should have surveillance endoscopy in 2 years    Alvarez's esophagus without dysplasia  Small area of Alvarez's without dysplasia noted on EGD earlier this year  Patient denies ever having experienced any overt heartburn symptoms, she is taking PPI therapy once daily at this point    - I explained to patient about diagnosis, pathophysiology and management of Alvarez's esophagus, she had many questions and I explained to her about surveillance and the role of acid reducing therapy    Patient expressed understanding and satisfaction    - Plan for surveillance EGD in 2 years

## 2019-09-17 NOTE — ASSESSMENT & PLAN NOTE
Small area of Alvarez's without dysplasia noted on EGD earlier this year  Patient denies ever having experienced any overt heartburn symptoms, she is taking PPI therapy once daily at this point    - I explained to patient about diagnosis, pathophysiology and management of Alvarez's esophagus, she had many questions and I explained to her about surveillance and the role of acid reducing therapy    Patient expressed understanding and satisfaction    - Plan for surveillance EGD in 2 years

## 2019-09-17 NOTE — TELEPHONE ENCOUNTER
Elmer Fritz pt    Pt is requesting to transfer her care to dr Anmol Bedoya and his PA's   She is scheduled as per he request  fyi

## 2019-10-29 ENCOUNTER — ANESTHESIA EVENT (OUTPATIENT)
Dept: GASTROENTEROLOGY | Facility: AMBULARY SURGERY CENTER | Age: 49
End: 2019-10-29

## 2019-10-31 LAB
ALBUMIN SERPL-MCNC: 4.1 G/DL (ref 3.6–5.1)
ALBUMIN/GLOB SERPL: 1.5 (CALC) (ref 1–2.5)
ALP SERPL-CCNC: 63 U/L (ref 33–130)
ALT SERPL-CCNC: 11 U/L (ref 6–29)
AST SERPL-CCNC: 14 U/L (ref 10–35)
BILIRUB DIRECT SERPL-MCNC: 0.2 MG/DL
BILIRUB INDIRECT SERPL-MCNC: 0.8 MG/DL (CALC) (ref 0.2–1.2)
BILIRUB SERPL-MCNC: 1 MG/DL (ref 0.2–1.2)
GLOBULIN SER CALC-MCNC: 2.8 G/DL (CALC) (ref 1.9–3.7)
LIPASE SERPL-CCNC: 18 U/L (ref 7–60)
PROT SERPL-MCNC: 6.9 G/DL (ref 6.1–8.1)

## 2019-11-06 ENCOUNTER — TELEPHONE (OUTPATIENT)
Dept: GASTROENTEROLOGY | Facility: AMBULARY SURGERY CENTER | Age: 49
End: 2019-11-06

## 2019-11-06 NOTE — TELEPHONE ENCOUNTER
Incoming fax from [facility]  for refill request for     Dicyclomine 10 MG Capsule     QTY 90        Take 1 capsule by mouth 3 times a day as needed ( abdominal cramping)

## 2019-11-06 NOTE — ANESTHESIA PREPROCEDURE EVALUATION
Review of Systems/Medical History  Patient summary reviewed  Chart reviewed      Cardiovascular  Exercise tolerance (METS): >4,     Pulmonary  Shortness of breath,        GI/Hepatic    GERD poorly controlled,        Negative  ROS        Endo/Other    Obesity    GYN  Negative gynecology ROS          Hematology  Negative hematology ROS      Musculoskeletal  Negative musculoskeletal ROS        Neurology  Negative neurology ROS      Psychology   Negative psychology ROS              Physical Exam    Airway    Mallampati score: II  TM Distance: >3 FB  Neck ROM: full     Dental   No notable dental hx     Cardiovascular  Cardiovascular exam normal    Pulmonary  Pulmonary exam normal     Other Findings        Anesthesia Plan  ASA Score- 2     Anesthesia Type- IV sedation with anesthesia with ASA Monitors  Additional Monitors:   Airway Plan:         Plan Factors-  Patient did not smoke on day of surgery  Induction- intravenous  Postoperative Plan-     Informed Consent- Anesthetic plan and risks discussed with patient  Patient summary reviewed and Nursing notes reviewed  History of anesthetic complications (PONV)  Mallampati: II  TM distance: >3 FB  Neck ROM: full    PULMONARY - negative  Patient's breath sounds clear to auscultation       NEURO/PSYCHNEURO/PSYCH Positives: psychiatric history (depression/anxiety)    ENDO/OTHER  Additional Endo/Other Comments: Obesity    DENTAL  - normal    CARDIOVASCULAR - negative  CARDIOVASCULAR Positives: cardiovascular exam normal regular normal   Comments:    GI/HEPATIC/RENAL  GI/HEPATIC/RENAL Positives: GERD well controlled,     Anesthesia type: general  ASA 2   (SCOP PATCH ORDERED)              Review of Systems/Medical History  Patient summary reviewed  Chart reviewed  History of anesthetic complications PONV    Cardiovascular  Negative cardio ROS    Pulmonary       GI/Hepatic    GERD well controlled,             Endo/Other  Negative endo/other ROS GYN  Negative gynecology ROS          Hematology  Negative hematology ROS      Musculoskeletal  Negative musculoskeletal ROS        Neurology  Negative neurology ROS      Psychology           Physical Exam    Airway    Mallampati score: II  TM Distance: >3 FB  Neck ROM: full     Dental   No notable dental hx     Cardiovascular  Comment: Negative ROS, Rhythm: regular, Rate: normal, Cardiovascular exam normal    Pulmonary  Pulmonary exam normal Breath sounds clear to auscultation,     Other Findings        Anesthesia Plan  ASA Score- 2     Anesthesia Type- IV sedation with anesthesia with ASA Monitors  Additional Monitors:   Airway Plan:         Plan Factors-    Induction- intravenous  Postoperative Plan-     Informed Consent- Anesthetic plan and risks discussed with patient

## 2019-11-07 ENCOUNTER — HOSPITAL ENCOUNTER (OUTPATIENT)
Dept: GASTROENTEROLOGY | Facility: AMBULARY SURGERY CENTER | Age: 49
Setting detail: OUTPATIENT SURGERY
Discharge: HOME | End: 2019-11-07
Attending: INTERNAL MEDICINE | Admitting: INTERNAL MEDICINE
Payer: COMMERCIAL

## 2019-11-07 ENCOUNTER — ANESTHESIA (OUTPATIENT)
Dept: GASTROENTEROLOGY | Facility: AMBULARY SURGERY CENTER | Age: 49
End: 2019-11-07

## 2019-11-07 VITALS
OXYGEN SATURATION: 100 % | TEMPERATURE: 97.1 F | HEART RATE: 70 BPM | HEIGHT: 64 IN | SYSTOLIC BLOOD PRESSURE: 124 MMHG | RESPIRATION RATE: 16 BRPM | BODY MASS INDEX: 34.49 KG/M2 | DIASTOLIC BLOOD PRESSURE: 71 MMHG | WEIGHT: 202 LBS

## 2019-11-07 DIAGNOSIS — K58.9 SPASM OF BOWEL: ICD-10-CM

## 2019-11-07 LAB
EXT PREGNANCY TEST URINE: NEGATIVE
EXT. CONTROL: NORMAL

## 2019-11-07 PROCEDURE — 81025 URINE PREGNANCY TEST: CPT | Performed by: INTERNAL MEDICINE

## 2019-11-07 PROCEDURE — 88305 TISSUE EXAM BY PATHOLOGIST: CPT | Performed by: PATHOLOGY

## 2019-11-07 PROCEDURE — 45385 COLONOSCOPY W/LESION REMOVAL: CPT | Performed by: INTERNAL MEDICINE

## 2019-11-07 RX ORDER — SODIUM CHLORIDE 9 MG/ML
INJECTION, SOLUTION INTRAVENOUS CONTINUOUS PRN
Status: DISCONTINUED | OUTPATIENT
Start: 2019-11-07 | End: 2019-11-07 | Stop reason: SURG

## 2019-11-07 RX ORDER — DICYCLOMINE HYDROCHLORIDE 10 MG/1
10 CAPSULE ORAL 3 TIMES DAILY PRN
Qty: 90 CAPSULE | Refills: 5 | Status: SHIPPED | OUTPATIENT
Start: 2019-11-07 | End: 2021-09-11 | Stop reason: SDUPTHER

## 2019-11-07 RX ORDER — PROPOFOL 10 MG/ML
INJECTION, EMULSION INTRAVENOUS AS NEEDED
Status: DISCONTINUED | OUTPATIENT
Start: 2019-11-07 | End: 2019-11-07 | Stop reason: SURG

## 2019-11-07 RX ORDER — SODIUM CHLORIDE, SODIUM LACTATE, POTASSIUM CHLORIDE, CALCIUM CHLORIDE 600; 310; 30; 20 MG/100ML; MG/100ML; MG/100ML; MG/100ML
125 INJECTION, SOLUTION INTRAVENOUS CONTINUOUS
Status: DISCONTINUED | OUTPATIENT
Start: 2019-11-07 | End: 2019-11-11 | Stop reason: HOSPADM

## 2019-11-07 RX ORDER — LIDOCAINE HYDROCHLORIDE 10 MG/ML
INJECTION, SOLUTION INFILTRATION; PERINEURAL AS NEEDED
Status: DISCONTINUED | OUTPATIENT
Start: 2019-11-07 | End: 2019-11-07 | Stop reason: SURG

## 2019-11-07 RX ADMIN — PROPOFOL 50 MG: 10 INJECTION, EMULSION INTRAVENOUS at 10:17

## 2019-11-07 RX ADMIN — SODIUM CHLORIDE: 0.9 INJECTION, SOLUTION INTRAVENOUS at 09:51

## 2019-11-07 RX ADMIN — SODIUM CHLORIDE, SODIUM LACTATE, POTASSIUM CHLORIDE, AND CALCIUM CHLORIDE 125 ML/HR: .6; .31; .03; .02 INJECTION, SOLUTION INTRAVENOUS at 10:01

## 2019-11-07 RX ADMIN — LIDOCAINE HYDROCHLORIDE 50 MG: 10 INJECTION, SOLUTION INFILTRATION; PERINEURAL at 10:12

## 2019-11-07 RX ADMIN — PROPOFOL 50 MG: 10 INJECTION, EMULSION INTRAVENOUS at 10:22

## 2019-11-07 RX ADMIN — PROPOFOL 150 MG: 10 INJECTION, EMULSION INTRAVENOUS at 10:12

## 2019-11-07 NOTE — H&P
History and Physical - [facility]  [place] 47 [place] o  female MRN: [id number]        HPI:  67-year-old female with history of GERD was referred for colonoscopy  Regular bowel movements and denies any blood or mucus in the stool  She never had colonoscopy in the past     Historical Information   Past Medical History:   Diagnosis Date    Acne     Anxiety     Depression     GERD (gastroesophageal reflux disease)     Morbidly obese (HCC)     PONV (postoperative nausea and vomiting)     Nausea    Vertigo     Wears glasses      Past Surgical History:   Procedure Laterality Date    BODY LIFT LOWER N/A 3/2/2017    Procedure: BODY LIFT ,[place] BUTTOCK ;  Surgeon: Kj Bishop MD;  [facility];  Service:     CHOLECYSTECTOMY      Laparoscopic    DILATION AND CURETTAGE OF UTERUS      LAPAROSCOPY FOR ECTOPIC PREGNANCY      x 2    LIPOSUCTION N/A 6/21/2019    Procedure: FLANKS/THIGHS LIPOSUCTION;  Surgeon: Kj Bishop MD;  [facility];  Service: Plastics    CO ESOPHAGOGASTRODUODENOSCOPY TRANSORAL DIAGNOSTIC N/A 3/6/2019    Procedure: ESOPHAGOGASTRODUODENOSCOPY (EGD); Surgeon: Christiano Sky MD;  [facility]; Service: Gastroenterology    CO EXCISE EXCESS SKIN TISSUE,ABDOMEN N/A 9/1/2016    Procedure: PANNICULECTOMY,  PLICATION MUSCLES ;  Surgeon: Kj Bishop MD;  [facility];  Service: Plastics    CO BUZZ [place] N/A 9/1/2016    Procedure: LIPOSUCTION ABDOMEN ;  Surgeon: Kj Bishop MD;  [facility];  Service: Plastics    CO SUSPENSION OF BREAST Bilateral 6/21/2019    Procedure: MASTOPEXY WITH FAT GRAFTING;  Surgeon: Kj Bishop MD;  [facility];  Service: Plastics    WISDOM TOOTH EXTRACTION       Social History   Social History     Substance and Sexual Activity   Alcohol Use No     Social History     Substance and Sexual Activity   Drug Use Not Currently    Types: Marijuana    Comment: socially   stopped 12/21/19 Social History     Tobacco Use   Smoking Status Never Smoker   Smokeless Tobacco Never Used     Family History   Adopted: Yes   Problem Relation Age of Onset    Heart attack Neg Hx     Stroke Neg Hx     Anuerysm Neg Hx     Clotting disorder Neg Hx     Hypertension Neg Hx     Hyperlipidemia Neg Hx     Arrhythmia Neg Hx     Heart failure Neg Hx     Coronary artery disease Neg Hx     Sudden death Neg Hx         scd       Meds/Allergies       (Not in a hospital admission)    Allergies   Allergen Reactions    Sertraline Rash     Other reaction(s): itchy rash, felt "loopy"       Objective     Blood pressure 111/72, pulse 79, temperature (!) 97 1 °F (36 2 °C), temperature source Temporal, resp  rate 18, height 5' 4" (1 626 m), weight 91 6 kg (202 lb), SpO2 100 %      PHYSICAL EXAM:    Gen: NAD  CV: S1 & S2 normal, RRR  CHEST: Clear to auscultate  ABD: soft, NT/ND, good bowel sounds  EXT: no edema    ASSESSMENT:     Screening for colon cancer    PLAN:    Colonoscopy

## 2019-11-07 NOTE — ANESTHESIA PREPROCEDURE EVALUATION
Review of Systems/Medical History  Patient summary reviewed  Chart reviewed  History of anesthetic complications PONV    Cardiovascular   Pulmonary  No shortness of breath, No recent URI ,        GI/Hepatic    GERD well controlled,        Negative  ROS        Endo/Other  Negative endo/other ROS      GYN       Hematology   Musculoskeletal  Negative musculoskeletal ROS        Neurology  Negative neurology ROS      Psychology   Anxiety, Depression ,              Physical Exam    Airway    Mallampati score: II  TM Distance: >3 FB  Neck ROM: full     Dental   No notable dental hx     Cardiovascular  Rhythm: regular,     Pulmonary  Breath sounds clear to auscultation,     Other Findings        Anesthesia Plan  ASA Score- 2     Anesthesia Type- IV sedation with anesthesia with ASA Monitors  Additional Monitors:   Airway Plan:         Plan Factors-    Induction- intravenous  Postoperative Plan-     Informed Consent- Anesthetic plan and risks discussed with patient  I personally reviewed this patient with the CRNA  Discussed and agreed on the Anesthesia Plan with the JARED Christopher

## 2019-11-19 ENCOUNTER — TELEPHONE (OUTPATIENT)
Dept: GASTROENTEROLOGY | Facility: CLINIC | Age: 49
End: 2019-11-19

## 2019-11-19 NOTE — TELEPHONE ENCOUNTER
----- Message from Geri Mata MD sent at 11/15/2019  6:28 PM EST -----  Colon polyp removed came back as pre cancerous tubular adenoma  And another polyp is benign hyperplastic      Repeat colonoscopy in 5 years

## 2019-11-19 NOTE — LETTER
November 19, 2019     [address] [place] [address]      Dear Ms  Melvin Jimenez: We have attempted to reach you regarding your results  We ask that you please contact our office upon receipt of this letter to receive your results  Thank you in advance for your cooperation and assistance          Sincerely,   [facility]og[facility] Staff  [phone]

## 2020-06-09 DIAGNOSIS — K21.9 GASTROESOPHAGEAL REFLUX DISEASE, ESOPHAGITIS PRESENCE NOT SPECIFIED: Primary | ICD-10-CM

## 2020-06-09 RX ORDER — PANTOPRAZOLE SODIUM 40 MG/1
40 TABLET, DELAYED RELEASE ORAL DAILY
Qty: 30 TABLET | Refills: 3 | Status: SHIPPED | OUTPATIENT
Start: 2020-06-09 | End: 2021-05-04 | Stop reason: SDUPTHER

## 2021-02-12 ENCOUNTER — TELEPHONE (OUTPATIENT)
Dept: GASTROENTEROLOGY | Facility: AMBULARY SURGERY CENTER | Age: 51
End: 2021-02-12

## 2021-02-13 ENCOUNTER — HOSPITAL ENCOUNTER (OUTPATIENT)
Dept: RADIOLOGY | Facility: HOSPITAL | Age: 51
Discharge: HOME | End: 2021-02-13
Payer: COMMERCIAL

## 2021-02-13 DIAGNOSIS — H93.12 TINNITUS, LEFT: ICD-10-CM

## 2021-02-13 DIAGNOSIS — R90.89 ABNORMAL BRAIN MRI: ICD-10-CM

## 2021-02-13 DIAGNOSIS — R42 RECURRENT VERTIGO: ICD-10-CM

## 2021-02-13 PROCEDURE — A9585 GADOBUTROL INJECTION: HCPCS | Performed by: PHYSICIAN ASSISTANT

## 2021-02-13 PROCEDURE — 70553 MRI BRAIN STEM W/O & W/DYE: CPT

## 2021-02-13 PROCEDURE — G1004 CDSM NDSC: HCPCS

## 2021-02-13 RX ADMIN — GADOBUTROL 10 ML: 604.72 INJECTION INTRAVENOUS at 13:03

## 2021-03-10 DIAGNOSIS — Z23 ENCOUNTER FOR IMMUNIZATION: ICD-10-CM

## 2021-03-18 ENCOUNTER — IMMUNIZATIONS (OUTPATIENT)
Dept: FAMILY MEDICINE CLINIC | Facility: HOSPITAL | Age: 51
End: 2021-03-18

## 2021-03-18 DIAGNOSIS — Z23 ENCOUNTER FOR IMMUNIZATION: Primary | ICD-10-CM

## 2021-03-18 PROCEDURE — 91300 SARS-COV-2 / COVID-19 MRNA VACCINE (PFIZER-BIONTECH) 30 MCG: CPT

## 2021-03-18 PROCEDURE — 0001A SARS-COV-2 / COVID-19 MRNA VACCINE (PFIZER-BIONTECH) 30 MCG: CPT

## 2021-04-08 ENCOUNTER — IMMUNIZATIONS (OUTPATIENT)
Dept: FAMILY MEDICINE CLINIC | Facility: HOSPITAL | Age: 51
End: 2021-04-08

## 2021-04-08 DIAGNOSIS — Z23 ENCOUNTER FOR IMMUNIZATION: Primary | ICD-10-CM

## 2021-04-08 PROCEDURE — 0002A SARS-COV-2 / COVID-19 MRNA VACCINE (PFIZER-BIONTECH) 30 MCG: CPT

## 2021-04-08 PROCEDURE — 91300 SARS-COV-2 / COVID-19 MRNA VACCINE (PFIZER-BIONTECH) 30 MCG: CPT

## 2021-04-19 DIAGNOSIS — K21.9 GASTROESOPHAGEAL REFLUX DISEASE, ESOPHAGITIS PRESENCE NOT SPECIFIED: ICD-10-CM

## 2021-04-19 RX ORDER — PANTOPRAZOLE SODIUM 40 MG/1
40 TABLET, DELAYED RELEASE ORAL DAILY
Qty: 30 TABLET | Refills: 0 | Status: CANCELLED | OUTPATIENT
Start: 2021-04-19

## 2021-05-04 DIAGNOSIS — K21.9 GASTROESOPHAGEAL REFLUX DISEASE WITHOUT ESOPHAGITIS: Primary | ICD-10-CM

## 2021-05-18 RX ORDER — PANTOPRAZOLE SODIUM 40 MG/1
40 TABLET, DELAYED RELEASE ORAL DAILY
Qty: 30 TABLET | Refills: 2 | Status: SHIPPED | OUTPATIENT
Start: 2021-05-18 | End: 2021-08-13

## 2021-05-19 ENCOUNTER — TELEPHONE (OUTPATIENT)
Dept: GASTROENTEROLOGY | Facility: AMBULARY SURGERY CENTER | Age: 51
End: 2021-05-19

## 2021-09-11 DIAGNOSIS — K58.9 SPASM OF BOWEL: ICD-10-CM

## 2021-09-12 DIAGNOSIS — K58.9 SPASM OF BOWEL: ICD-10-CM

## 2021-09-12 RX ORDER — DICYCLOMINE HYDROCHLORIDE 10 MG/1
10 CAPSULE ORAL 3 TIMES DAILY PRN
Qty: 90 CAPSULE | Refills: 0 | Status: CANCELLED | OUTPATIENT
Start: 2021-09-12

## 2021-09-13 ENCOUNTER — TELEPHONE (OUTPATIENT)
Dept: GASTROENTEROLOGY | Facility: CLINIC | Age: 51
End: 2021-09-13

## 2021-09-13 RX ORDER — DICYCLOMINE HYDROCHLORIDE 10 MG/1
10 CAPSULE ORAL 3 TIMES DAILY PRN
Qty: 90 CAPSULE | Refills: 0 | Status: SHIPPED | OUTPATIENT
Start: 2021-09-13 | End: 2021-10-05

## 2021-09-13 NOTE — TELEPHONE ENCOUNTER
Absolutely I will forward this on since an appointment was just made for this pt about a half hr ago  9/13/2021 9:54 AM   Which is why I sent it to clerical to begin with

## 2021-09-13 NOTE — TELEPHONE ENCOUNTER
Amrita had left VM left this morning advising pt that she needs appt  Pt was left VM advising the same on 08/16/21  Pt was spoken to about making a fu appt for further refills on 05/19/21  I responded the same to her MyChart notification for refills  Pt has been advised for 4months that she needs fu appt for any further refills

## 2021-09-17 NOTE — PROGRESS NOTES
Cardiology  Acute  Office Visit Note  Keith Holguin   45 y o    female   MRN: [id number]  [address]  [address] [place] [address] [address]  [phone]    PCP: Levin Leventhal, DO  Cardiologist: Dr Marietta Guerrero            Summary of recommendations  Heart healthy diet  Educational information provided  CBC, CMP, TSH  EKG stress test   Echocardiogram  If any of her testing is abnormal, will communicate through messaging via [email or web address] or phone  Follow up will be scheduled with Dr Marietta Guerrero p r n  Assessment/plan  Chest pain, atypical   Will get an EKG stress test and echocardiogram, lab analysis  GERD/Barretts esophagus-she has an appointment at the end of the month with gastroenterologist   Lipids: 4/10/21:  LDL 84, non   Cardiac testing   TTE 10/18  EF 60%  No RWMA  Grade 1 DD  RV normal  LAE  · EKG stress 10/18  Richy protocol x 8 min and 1 sec  Target heart rate was achieved  There was no chest pain during stress  The stress ECG was negative for ischemia   Normal study after maximal exercise  · 24 hr Holter 10/18   0 premature ventricular contractions, 22 premature atrial contractions, normal heart rate variability with lowest 53, highest 136  In sinus rhythm throughout  Multiple symptoms correlate with normal sinus rhythm  HPI  Lida Childress is a 44 yo female who has had atypical chest pain, evaluated in the past  An echocardiogram, ekg stress test and Holter monitor were essentially normal in October 2018  She was evaluated by cardiologist Dr Marietta Guerrero  9/17/21   Acute visit   Chief complaint:  Constant, squeezing left chest pain, occasional, intermittent palpitations, intermittent dizziness    EKG: Normal sinus rhythm 70 beats per minute  Normal intervals     Blood pressure:  112/70   HR 70/reg    Medications: Spironolactone 50 mg daily, for treatment of acne    Recently started on meloxicam, for low back pain    Social history:  She does not smoke  No regular alcohol  Occ coffee, occasionally drinks tea    ROS: She reports recently she has been experiencing constant, squeezing type left-sided chest pain underneath her left breast radiating laterally  She does not believe it is related to any particular foods  It can occur when she is lying down  Can occur when she walks  It can occur randomly  Nothing seems to make it better at this point  Occasional palpitations  She believes this is caused by some anxiety thinking about her chest pain  This is not particularly problematic, nor is the intermittent dizziness  Exam unremarkable  Will get a repeat lab analysis, EKG stress test and echocardiogram, for atypical chest pain     Will communicate any abnormal findings  She will return to the office p r n  Recommend GI evaluation as is scheduled        I have spent 40 minutes with Patient  today in which greater than 50% of this time was spent in counseling/coordination of care regarding Intructions for management, Patient and family education, Importance of tx compliance and Impressions  Assessment  Diagnoses and all orders for this visit:    Chest pain, unspecified type  -     POCT ECG  -     CBC and Platelet; Future  -     Comprehensive metabolic panel; Future  -     TSH, 3rd generation with Free T4 reflex; Future  -     Stress test only, exercise; Future  -     Echo complete with contrast if indicated; Future    Class 1 obesity due to excess calories with body mass index (BMI) of 34 0 to 34 9 in adult, unspecified whether serious comorbidity present    Dizziness  -     TSH, 3rd generation with Free T4 reflex; Future    Other orders  -     cyclobenzaprine (FLEXERIL) 10 mg tablet;  Take 10 mg by mouth 3 (three) times a day as needed  -     meloxicam (MOBIC) 7 5 mg tablet; TAKE 1 2 TABLETS BY MOUTH DAILY AS NEEDED FOR PAIN  -     Discontinue: naproxen (NAPROSYN) 500 mg tablet; TAKE 1 TABLET (500 MG TOTAL) BY MOUTH 2 (TWO) TIMES A DAY WITH MEALS  TAKE WITH MEALS  Past Medical History:   Diagnosis Date    Acne     Anxiety     Depression     GERD (gastroesophageal reflux disease)     Lower back pain     Morbidly obese (HCC)     PONV (postoperative nausea and vomiting)     Nausea    Vertigo     Wears glasses        Review of Systems   Constitutional: Negative for chills  Cardiovascular: Positive for chest pain and palpitations  Negative for claudication, cyanosis, dyspnea on exertion, irregular heartbeat, leg swelling, near-syncope, orthopnea, paroxysmal nocturnal dyspnea and syncope  Respiratory: Negative for cough and shortness of breath  Gastrointestinal: Negative for heartburn and nausea  Neurological: Positive for dizziness  Negative for focal weakness, headaches, light-headedness and weakness  All other systems reviewed and are negative  Allergies   Allergen Reactions    Sertraline Rash     Other reaction(s): itchy rash, felt "loopy"       Current Outpatient Medications:     acetaminophen (TYLENOL) 325 mg tablet, Take 650 mg by mouth every 6 (six) hours as needed for mild pain, Disp: , Rfl:     cyclobenzaprine (FLEXERIL) 10 mg tablet, Take 10 mg by mouth 3 (three) times a day as needed, Disp: , Rfl:     dicyclomine (BENTYL) 10 mg capsule, Take 1 capsule (10 mg total) by mouth 3 (three) times a day as needed (abdominal cramping), Disp: 90 capsule, Rfl: 0    meclizine (ANTIVERT) 12 5 MG tablet, Take 12 5 mg by mouth every 8 (eight) hours as needed  , Disp: , Rfl:     meloxicam (MOBIC) 7 5 mg tablet, TAKE 1 2 TABLETS BY MOUTH DAILY AS NEEDED FOR PAIN, Disp: , Rfl:     spironolactone (ALDACTONE) 50 mg tablet, Take 50 mg by mouth daily  , Disp: , Rfl:     ALPRAZolam (XANAX) 0 5 mg tablet, Take 1 tab PO 2 hours prior to MRI, may repeat 1 hour prior to MRI if needed  Need     (Patient not taking: Reported on 9/20/2021), Disp: 2 tablet, Rfl: 0    BIOTIN PO, Take 1 tablet by mouth daily  (Patient not taking: Reported on 9/20/2021), Disp: , Rfl:     fluticasone (FLONASE) 50 mcg/act nasal spray, 2 sprays into each nostril as needed, Disp: , Rfl:     pantoprazole (PROTONIX) 40 mg tablet, TAKE 1 TABLET BY MOUTH EVERY DAY, Disp: 90 tablet, Rfl: 0    tretinoin (RETIN-A) 0 05 % cream, Apply 1 application topically daily at bedtime as needed, Disp: , Rfl:         Social History     Socioeconomic History    Marital status: /Civil Union     Spouse name: Not on file    Number of children: Not on file    Years of education: Not on file    Highest education level: Not on file   Occupational History    Not on file   Tobacco Use    Smoking status: Never Smoker    Smokeless tobacco: Never Used   Vaping Use    Vaping Use: Never used   Substance and Sexual Activity    Alcohol use: No    Drug use: Not Currently     Types: Marijuana     Comment: socially  stopped 12/21/19    Sexual activity: Not on file   Other Topics Concern    Not on file   Social History Narrative    Not on file     Social Determinants of Health     Financial Resource Strain:     Difficulty of Paying Living Expenses:    Food Insecurity:     Worried About Running Out of Food in the Last Year:     [address] in the Last Year:    Transportation Needs:     Lack of Transportation (Medical):      Lack of Transportation (Non-Medical):    Physical Activity:     Days of Exercise per Week:     Minutes of Exercise per Session:    Stress:     Feeling of Stress :    Social Connections:     Frequency of Communication with Friends and Family:     Frequency of Social Gatherings with Friends and Family:     Attends [employer]oration[employer] Services:     Active Member of Clubs or Organizations:     Attends Club or Organization Meetings:     Marital Status:    Intimate Partner Violence:     Fear of Current or Ex-Partner:     Emotionally Abused:     Physically Abused:     Sexually Abused:        Family History   Adopted: Yes   Problem Relation Age of Onset    Multiple sclerosis Brother     Heart attack Neg Hx     Stroke Neg Hx     Anuerysm Neg Hx     Clotting disorder Neg Hx     Hypertension Neg Hx     Hyperlipidemia Neg Hx     Arrhythmia Neg Hx     Heart failure Neg Hx     Coronary artery disease Neg Hx     Sudden death Neg Hx         scd       Physical Exam  Vitals and nursing note reviewed  Constitutional:       General: She is not in acute distress  Appearance: She is not diaphoretic  HENT:      Head: Normocephalic and atraumatic  Eyes:      Conjunctiva/sclera: Conjunctivae normal    Cardiovascular:      Rate and Rhythm: Normal rate and regular rhythm  Pulses: Intact distal pulses  Heart sounds: Normal heart sounds  Pulmonary:      Effort: Pulmonary effort is normal       Breath sounds: Normal breath sounds  Abdominal:      General: Bowel sounds are normal       Palpations: Abdomen is soft  Musculoskeletal:         General: Normal range of motion  Cervical back: Normal range of motion and neck supple  Skin:     General: Skin is warm and dry  Neurological:      Mental Status: She is alert and oriented to person, place, and time  Vitals: Blood pressure 112/70, pulse 87, resp  rate 18, height 5' 4" (1 626 m), weight 97 6 kg (215 lb 4 oz), SpO2 99 %     Wt Readings from Last 3 Encounters:   09/20/21 97 6 kg (215 lb 4 oz)   12/08/20 91 2 kg (201 lb)   11/07/19 91 6 kg (202 lb)         Labs & Results:  Lab Results   Component Value Date    WBC 11 06 (H) 09/28/2018    HGB 13 8 09/28/2018    HCT 40 4 09/28/2018    MCV 90 09/28/2018     09/28/2018     No results found for: BNP  No components found for: CHEM  Troponin I   Date Value Ref Range Status   09/28/2018 <0 02 <=0 04 ng/mL Final     Comment:       Siemens Chemistry analyzer 99% cutoff is > 0 04 ng/mL in network labs     o cTnI 99% cutoff is useful only when applied to patients in the clinical setting of myocardial ischemia   o cTnI 99% cutoff should be interpreted in the context of clinical history, ECG findings and possibly cardiac imaging to establish correct diagnosis  o cTnI 99% cutoff may be suggestive but clearly not indicative of a coronary event without the clinical setting of myocardial ischemia      2018 <0 02 <=0 04 ng/mL Final     Comment:       Siemens Chemistry analyzer 99% cutoff is > 0 04 ng/mL in network labs     o cTnI 99% cutoff is useful only when applied to patients in the clinical setting of myocardial ischemia   o cTnI 99% cutoff should be interpreted in the context of clinical history, ECG findings and possibly cardiac imaging to establish correct diagnosis  o cTnI 99% cutoff may be suggestive but clearly not indicative of a coronary event without the clinical setting of myocardial ischemia      2018 <0 02 <=0 04 ng/mL Final     Comment:       Siemens Chemistry analyzer 99% cutoff is > 0 04 ng/mL in network labs     o cTnI 99% cutoff is useful only when applied to patients in the clinical setting of myocardial ischemia   o cTnI 99% cutoff should be interpreted in the context of clinical history, ECG findings and possibly cardiac imaging to establish correct diagnosis  o cTnI 99% cutoff may be suggestive but clearly not indicative of a coronary event without the clinical setting of myocardial ischemia         Results for orders placed during the hospital encounter of 10/18/18    Echo complete with contrast if indicated    Narrative  [place] [address], [address] [place] [address]  [phone]    Transthoracic Echocardiogram  2D, M-mode, Doppler, and Color Doppler    Study date:  18-Oct-2018    Patient: Lamin Marquez  MR number: [id number]  Account number: [de-identified]  : 1970  Age: 51 years  Gender: Female  Status: Outpatient  Location: [facility]  Height: 63 in  Weight: 210 5 lb  BP: 117/ 59 mmHg    Indications: Chest Pain    Diagnoses: R07 9 - Chest pain, unspecified    Sonographer:  Piper Romero RDCS  Primary Physician:  Jose Wetzel DO  Referring Physician:  Ramos Leon MD  Group:  [facility]  Interpreting Physician:  Pawan Torres MD    SUMMARY    LEFT VENTRICLE:  Systolic function was normal  Ejection fraction was estimated to be 60 %  There were no regional wall motion abnormalities  Doppler parameters were consistent with abnormal left ventricular relaxation (grade 1 diastolic dysfunction)  LEFT ATRIUM:  The atrium was dilated  HISTORY: PRIOR HISTORY: Shortness of Breath    PROCEDURE: The study was performed in the [facility]  This was a routine study  The transthoracic approach was used  The study included complete 2D imaging, M-mode, complete spectral Doppler, and color Doppler  The  heart rate was 69 bpm, at the start of the study  Images were obtained from the parasternal, apical, subcostal, and suprasternal notch acoustic windows  Echocardiographic views were limited due to poor acoustic window availability,  decreased penetration, and lung interference  This was a technically difficult study  LEFT VENTRICLE: Size was normal  Systolic function was normal  Ejection fraction was estimated to be 60 %  There were no regional wall motion abnormalities  Wall thickness was normal  DOPPLER: There was an increased relative contribution  of atrial contraction to ventricular filling  Doppler parameters were consistent with abnormal left ventricular relaxation (grade 1 diastolic dysfunction)  RIGHT VENTRICLE: The size was normal  Systolic function was normal  Wall thickness was normal     LEFT ATRIUM: The atrium was dilated  RIGHT ATRIUM: Size was normal     MITRAL VALVE: Valve structure was normal  There was normal leaflet separation  DOPPLER: The transmitral velocity was within the normal range  There was no evidence for stenosis  There was trace regurgitation      AORTIC VALVE: The valve was trileaflet  Leaflets exhibited normal thickness and normal cuspal separation  DOPPLER: Transaortic velocity was within the normal range  There was no evidence for stenosis  There was no significant  regurgitation  TRICUSPID VALVE: The valve structure was normal  There was normal leaflet separation  DOPPLER: The transtricuspid velocity was within the normal range  There was no evidence for stenosis  There was trace regurgitation  Pulmonary artery  systolic pressure was within the normal range  Estimated peak PA pressure was 28 mmHg  PULMONIC VALVE: Leaflets exhibited normal thickness, no calcification, and normal cuspal separation  DOPPLER: The transpulmonic velocity was within the normal range  There was trace regurgitation  PERICARDIUM: There was no pericardial effusion  The pericardium was normal in appearance  AORTA: The root exhibited normal size  SYSTEMIC VEINS: IVC: The inferior vena cava was normal in size  Respirophasic changes were normal     SYSTEM MEASUREMENT TABLES    2D  %FS: 31 18 %  EF(Teich): 59 37 %  IVSd: 0 8 cm  LA Diam: 3 42 cm  LVIDd: 4 17 cm  LVIDs: 2 87 cm  LVPWd: 1 01 cm    CW  TR MaxP 04 mmHg  TR Vmax: 2 5 m/s    MM  TAPSE: 2 77 cm    [facility] Accredited [facility]chocardi[facility]    Prepared and electronically signed by    Fadumo Yarbrough MD  Signed 18-Oct-2018 15:35:01    No results found for this or any previous visit  This note was completed in part utilizing [employer] direct voice recognition software  Grammatical errors, random word insertion, spelling mistakes, and incomplete sentences may be an occasional consequence of the system secondary to software limitations, ambient noise and hardware issues  At the time of dictation, efforts were made to edit, clarify and /or correct errors  Please read the chart carefully and recognize, using context, where substitutions have occurred    If you have any questions or concerns about the context, text or information contained within the body of this dictation, please contact myself, the provider, for further clarification

## 2021-09-20 ENCOUNTER — OFFICE VISIT (OUTPATIENT)
Dept: CARDIOLOGY CLINIC | Facility: CLINIC | Age: 51
End: 2021-09-20
Payer: COMMERCIAL

## 2021-09-20 VITALS
HEART RATE: 87 BPM | RESPIRATION RATE: 18 BRPM | SYSTOLIC BLOOD PRESSURE: 112 MMHG | OXYGEN SATURATION: 99 % | BODY MASS INDEX: 36.75 KG/M2 | HEIGHT: 64 IN | DIASTOLIC BLOOD PRESSURE: 70 MMHG | WEIGHT: 215.25 LBS

## 2021-09-20 DIAGNOSIS — R42 DIZZINESS: ICD-10-CM

## 2021-09-20 DIAGNOSIS — R07.9 CHEST PAIN, UNSPECIFIED TYPE: Primary | ICD-10-CM

## 2021-09-20 DIAGNOSIS — E66.09 CLASS 1 OBESITY DUE TO EXCESS CALORIES WITH BODY MASS INDEX (BMI) OF 34.0 TO 34.9 IN ADULT, UNSPECIFIED WHETHER SERIOUS COMORBIDITY PRESENT: ICD-10-CM

## 2021-09-20 PROCEDURE — 99215 OFFICE O/P EST HI 40 MIN: CPT | Performed by: NURSE PRACTITIONER

## 2021-09-20 PROCEDURE — 93000 ELECTROCARDIOGRAM COMPLETE: CPT | Performed by: NURSE PRACTITIONER

## 2021-09-20 RX ORDER — NAPROXEN 500 MG/1
TABLET ORAL
COMMUNITY
Start: 2021-08-06 | End: 2021-09-20 | Stop reason: ALTCHOICE

## 2021-09-20 RX ORDER — MELOXICAM 7.5 MG/1
TABLET ORAL
COMMUNITY
Start: 2021-09-08

## 2021-09-20 RX ORDER — CYCLOBENZAPRINE HCL 10 MG
10 TABLET ORAL 3 TIMES DAILY PRN
COMMUNITY
Start: 2021-08-31

## 2021-09-20 NOTE — LETTER
September 20, 2021     Jose Aviles DO  [facility]  49  [phone]    Patient: Claudia Singh   YOB: 1970   Date of Visit: 9/20/2021       Dear Dr Jorge Heredia: Thank you for referring Yenny Gerard to me for evaluation  Below are my notes for this consultation  If you have questions, please do not hesitate to call me  I look forward to following your patient along with you  Sincerely,        EVELIA Escamilla        CC: MD Mitchel Almeida, [facility]  9/20/2021  7:39 AM  Sign when Signing Visit  Cardiology  Acute  Office Visit Note  Claudia Singh   45 y o    female   MRN: [id number]  [address]  [address]  [address] [place] [id number] [phone]    PCP: Jose Aviles DO  Cardiologist: Dr Emilia Magallon            Summary of recommendations  Heart healthy diet  Educational information provided  CBC, CMP, TSH  EKG stress test   Echocardiogram  If any of her testing is abnormal, will communicate through messaging via [employer] or phone  Follow up will be scheduled with Dr Emilia Magallon p r n  Assessment/plan  Chest pain, atypical   Will get an EKG stress test and echocardiogram, lab analysis  GERD/Barretts esophagus-she has an appointment at the end of the month with gastroenterologist   Lipids: 4/10/21:  LDL 84, non   Cardiac testing   TTE 10/18  EF 60%  No RWMA  Grade 1 DD  RV normal  LAE  · EKG stress 10/18  Richy protocol x 8 min and 1 sec  Target heart rate was achieved  There was no chest pain during stress  The stress ECG was negative for ischemia   Normal study after maximal exercise  · 24 hr Holter 10/18   0 premature ventricular contractions, 22 premature atrial contractions, normal heart rate variability with lowest 53, highest 136  In sinus rhythm throughout  Multiple symptoms correlate with normal sinus rhythm              HPI  Yenny Gerard is a 44 yo female who has had atypical chest pain, evaluated in the past  An echocardiogram, ekg stress test and Holter monitor were essentially normal in October 2018  She was evaluated by cardiologist Dr Kitty Christensen  9/17/21   Acute visit   Chief complaint:  Constant, squeezing left chest pain, occasional, intermittent palpitations, intermittent dizziness    EKG: Normal sinus rhythm 70 beats per minute  Normal intervals     Blood pressure:  112/70   HR 70/reg    Medications: Spironolactone 50 mg daily, for treatment of acne  Recently started on meloxicam, for low back pain    Social history:  She does not smoke  No regular alcohol  Occ coffee, occasionally drinks tea    ROS: She reports recently she has been experiencing constant, squeezing type left-sided chest pain underneath her left breast radiating laterally  She does not believe it is related to any particular foods  It can occur when she is lying down  Can occur when she walks  It can occur randomly  Nothing seems to make it better at this point  Occasional palpitations  She believes this is caused by some anxiety thinking about her chest pain  This is not particularly problematic, nor is the intermittent dizziness  Exam unremarkable  Will get a repeat lab analysis, EKG stress test and echocardiogram, for atypical chest pain     Will communicate any abnormal findings  She will return to the office p r n  Recommend GI evaluation as is scheduled        I have spent 40 minutes with Patient  today in which greater than 50% of this time was spent in counseling/coordination of care regarding Intructions for management, Patient and family education, Importance of tx compliance and Impressions  Assessment  Diagnoses and all orders for this visit:    Chest pain, unspecified type  -     POCT ECG  -     CBC and Platelet; Future  -     Comprehensive metabolic panel; Future  -     TSH, 3rd generation with Free T4 reflex; Future  -     Stress test only, exercise;  Future  -     Echo complete with contrast if indicated; Future    Class 1 obesity due to excess calories with body mass index (BMI) of 34 0 to 34 9 in adult, unspecified whether serious comorbidity present    Dizziness  -     TSH, 3rd generation with Free T4 reflex; Future    Other orders  -     cyclobenzaprine (FLEXERIL) 10 mg tablet; Take 10 mg by mouth 3 (three) times a day as needed  -     meloxicam (MOBIC) 7 5 mg tablet; TAKE 1 2 TABLETS BY MOUTH DAILY AS NEEDED FOR PAIN  -     Discontinue: naproxen (NAPROSYN) 500 mg tablet; TAKE 1 TABLET (500 MG TOTAL) BY MOUTH 2 (TWO) TIMES A DAY WITH MEALS  TAKE WITH MEALS  Past Medical History:   Diagnosis Date    Acne     Anxiety     Depression     GERD (gastroesophageal reflux disease)     Lower back pain     Morbidly obese (HCC)     PONV (postoperative nausea and vomiting)     Nausea    Vertigo     Wears glasses        Review of Systems   Constitutional: Negative for chills  Cardiovascular: Positive for chest pain and palpitations  Negative for claudication, cyanosis, dyspnea on exertion, irregular heartbeat, leg swelling, near-syncope, orthopnea, paroxysmal nocturnal dyspnea and syncope  Respiratory: Negative for cough and shortness of breath  Gastrointestinal: Negative for heartburn and nausea  Neurological: Positive for dizziness  Negative for focal weakness, headaches, light-headedness and weakness  All other systems reviewed and are negative  Allergies   Allergen Reactions    Sertraline Rash     Other reaction(s): itchy rash, felt "loopy"           Current Outpatient Medications:     acetaminophen (TYLENOL) 325 mg tablet, Take 650 mg by mouth every 6 (six) hours as needed for mild pain, Disp: , Rfl:     cyclobenzaprine (FLEXERIL) 10 mg tablet, Take 10 mg by mouth 3 (three) times a day as needed, Disp: , Rfl:     dicyclomine (BENTYL) 10 mg capsule, Take 1 capsule (10 mg total) by mouth 3 (three) times a day as needed (abdominal cramping), Disp: 90 capsule, Rfl: 0    meclizine (ANTIVERT) 12 5 MG tablet, Take 12 5 mg by mouth every 8 (eight) hours as needed  , Disp: , Rfl:     meloxicam (MOBIC) 7 5 mg tablet, TAKE 1 2 TABLETS BY MOUTH DAILY AS NEEDED FOR PAIN, Disp: , Rfl:     spironolactone (ALDACTONE) 50 mg tablet, Take 50 mg by mouth daily  , Disp: , Rfl:     ALPRAZolam (XANAX) 0 5 mg tablet, Take 1 tab PO 2 hours prior to MRI, may repeat 1 hour prior to MRI if needed  Need    (Patient not taking: Reported on 9/20/2021), Disp: 2 tablet, Rfl: 0    BIOTIN PO, Take 1 tablet by mouth daily  (Patient not taking: Reported on 9/20/2021), Disp: , Rfl:     fluticasone (FLONASE) 50 mcg/act nasal spray, 2 sprays into each nostril as needed, Disp: , Rfl:     pantoprazole (PROTONIX) 40 mg tablet, TAKE 1 TABLET BY MOUTH EVERY DAY, Disp: 90 tablet, Rfl: 0    tretinoin (RETIN-A) 0 05 % cream, Apply 1 application topically daily at bedtime as needed, Disp: , Rfl:         Social History     Socioeconomic History    Marital status: /Civil Union     Spouse name: Not on file    Number of children: Not on file    Years of education: Not on file    Highest education level: Not on file   Occupational History    Not on file   Tobacco Use    Smoking status: Never Smoker    Smokeless tobacco: Never Used   Vaping Use    Vaping Use: Never used   Substance and Sexual Activity    Alcohol use: No    Drug use: Not Currently     Types: Marijuana     Comment: socially  stopped 12/21/19    Sexual activity: Not on file   Other Topics Concern    Not on file   Social History Narrative    Not on file     Social Determinants of Health     Financial Resource Strain:     Difficulty of Paying Living Expenses:    Food Insecurity:     Worried About Running Out of Food in the Last Year:     [address] in the Last Year:    Transportation Needs:     Lack of Transportation (Medical):      Lack of Transportation (Non-Medical):    Physical Activity:     Days of Exercise per Week:     Minutes of Exercise per Session:    Stress:     Feeling of Stress :    Social Connections:     Frequency of Communication with Friends and Family:     Frequency of Social Gatherings with Friends and Family:     Attends Sikhism Services:     Active Member of Clubs or Organizations:     Attends Club or Organization Meetings:     Marital Status:    Intimate Partner Violence:     Fear of Current or Ex-Partner:     Emotionally Abused:     Physically Abused:     Sexually Abused:        Family History   Adopted: Yes   Problem Relation Age of Onset    Multiple sclerosis Brother     Heart attack Neg Hx     Stroke Neg Hx     Anuerysm Neg Hx     Clotting disorder Neg Hx     Hypertension Neg Hx     Hyperlipidemia Neg Hx     Arrhythmia Neg Hx     Heart failure Neg Hx     Coronary artery disease Neg Hx     Sudden death Neg Hx         scd       Physical Exam  Vitals and nursing note reviewed  Constitutional:       General: She is not in acute distress  Appearance: She is not diaphoretic  HENT:      Head: Normocephalic and atraumatic  Eyes:      Conjunctiva/sclera: Conjunctivae normal    Cardiovascular:      Rate and Rhythm: Normal rate and regular rhythm  Pulses: Intact distal pulses  Heart sounds: Normal heart sounds  Pulmonary:      Effort: Pulmonary effort is normal       Breath sounds: Normal breath sounds  Abdominal:      General: Bowel sounds are normal       Palpations: Abdomen is soft  Musculoskeletal:         General: Normal range of motion  Cervical back: Normal range of motion and neck supple  Skin:     General: Skin is warm and dry  Neurological:      Mental Status: She is alert and oriented to person, place, and time  Vitals: Blood pressure 112/70, pulse 87, resp  rate 18, height 5' 4" (1 626 m), weight 97 6 kg (215 lb 4 oz), SpO2 99 %     Wt Readings from Last 3 Encounters:   09/20/21 97 6 kg (215 lb 4 oz)   12/08/20 91 2 kg (201 lb) 11/07/19 91 6 kg (202 lb)         Labs & Results:  Lab Results   Component Value Date    WBC 11 06 (H) 09/28/2018    HGB 13 8 09/28/2018    HCT 40 4 09/28/2018    MCV 90 09/28/2018     09/28/2018     No results found for: BNP  No components found for: CHEM  Troponin I   Date Value Ref Range Status   09/28/2018 <0 02 <=0 04 ng/mL Final     Comment:       Siemens Chemistry analyzer 99% cutoff is > 0 04 ng/mL in network labs     o cTnI 99% cutoff is useful only when applied to patients in the clinical setting of myocardial ischemia   o cTnI 99% cutoff should be interpreted in the context of clinical history, ECG findings and possibly cardiac imaging to establish correct diagnosis  o cTnI 99% cutoff may be suggestive but clearly not indicative of a coronary event without the clinical setting of myocardial ischemia      09/28/2018 <0 02 <=0 04 ng/mL Final     Comment:       Siemens Chemistry analyzer 99% cutoff is > 0 04 ng/mL in network labs     o cTnI 99% cutoff is useful only when applied to patients in the clinical setting of myocardial ischemia   o cTnI 99% cutoff should be interpreted in the context of clinical history, ECG findings and possibly cardiac imaging to establish correct diagnosis  o cTnI 99% cutoff may be suggestive but clearly not indicative of a coronary event without the clinical setting of myocardial ischemia      09/28/2018 <0 02 <=0 04 ng/mL Final     Comment:       Siemens Chemistry analyzer 99% cutoff is > 0 04 ng/mL in network labs     o cTnI 99% cutoff is useful only when applied to patients in the clinical setting of myocardial ischemia   o cTnI 99% cutoff should be interpreted in the context of clinical history, ECG findings and possibly cardiac imaging to establish correct diagnosis  o cTnI 99% cutoff may be suggestive but clearly not indicative of a coronary event without the clinical setting of myocardial ischemia         Results for orders placed during the hospital encounter of 10/18/18    Echo complete with contrast if indicated    Narrative  [facility], [facility]  [phone]    Transthoracic Echocardiogram  2D, M-mode, Doppler, and Color Doppler    Study date:  18-Oct-2018    Patient: Aries Cruz  MR number: [id number]  Account number: [de-identified]  : 1970  Age: 51 years  Gender: Female  Status: Outpatient  Location: [address] [place] [address]  Height: 63 in  Weight: 210 5 lb  BP: 117/ 59 mmHg    Indications: Chest Pain    Diagnoses: R07 9 - Chest pain, unspecified    Sonographer:  April Childress RDCS  Primary Physician:  Sima Walter DO  Referring Physician:  Alverto Rosa MD  Group:  [facility]  Interpreting Physician:  Nneka Murphy MD    SUMMARY    LEFT VENTRICLE:  Systolic function was normal  Ejection fraction was estimated to be 60 %  There were no regional wall motion abnormalities  Doppler parameters were consistent with abnormal left ventricular relaxation (grade 1 diastolic dysfunction)  LEFT ATRIUM:  The atrium was dilated  HISTORY: PRIOR HISTORY: Shortness of Breath    PROCEDURE: The study was performed in the [address] [place] [address]  This was a routine study  The transthoracic approach was used  The study included complete 2D imaging, M-mode, complete spectral Doppler, and color Doppler  The  heart rate was 69 bpm, at the start of the study  Images were obtained from the parasternal, apical, subcostal, and suprasternal notch acoustic windows  Echocardiographic views were limited due to poor acoustic window availability,  decreased penetration, and lung interference  This was a technically difficult study  LEFT VENTRICLE: Size was normal  Systolic function was normal  Ejection fraction was estimated to be 60 %  There were no regional wall motion abnormalities   Wall thickness was normal  DOPPLER: There was an increased relative contribution  of atrial contraction to ventricular filling  Doppler parameters were consistent with abnormal left ventricular relaxation (grade 1 diastolic dysfunction)  RIGHT VENTRICLE: The size was normal  Systolic function was normal  Wall thickness was normal     LEFT ATRIUM: The atrium was dilated  RIGHT ATRIUM: Size was normal     MITRAL VALVE: Valve structure was normal  There was normal leaflet separation  DOPPLER: The transmitral velocity was within the normal range  There was no evidence for stenosis  There was trace regurgitation  AORTIC VALVE: The valve was trileaflet  Leaflets exhibited normal thickness and normal cuspal separation  DOPPLER: Transaortic velocity was within the normal range  There was no evidence for stenosis  There was no significant  regurgitation  TRICUSPID VALVE: The valve structure was normal  There was normal leaflet separation  DOPPLER: The transtricuspid velocity was within the normal range  There was no evidence for stenosis  There was trace regurgitation  Pulmonary artery  systolic pressure was within the normal range  Estimated peak PA pressure was 28 mmHg  PULMONIC VALVE: Leaflets exhibited normal thickness, no calcification, and normal cuspal separation  DOPPLER: The transpulmonic velocity was within the normal range  There was trace regurgitation  PERICARDIUM: There was no pericardial effusion  The pericardium was normal in appearance  AORTA: The root exhibited normal size  SYSTEMIC VEINS: IVC: The inferior vena cava was normal in size   Respirophasic changes were normal     SYSTEM MEASUREMENT TABLES    2D  %FS: 31 18 %  EF(Teich): 59 37 %  IVSd: 0 8 cm  LA Diam: 3 42 cm  LVIDd: 4 17 cm  LVIDs: 2 87 cm  LVPWd: 1 01 cm    CW  TR MaxP 04 mmHg  TR Vmax: 2 5 m/s    MM  TAPSE: 2 77 cm    [employer] Accredited Echocardiography Laboratory    Prepared and electronically signed by    Altagracia Cody MD  Signed 18-Oct-2018 15:35:01    No results found for this or any previous visit  This note was completed in part utilizing m-modal fluency direct voice recognition software  Grammatical errors, random word insertion, spelling mistakes, and incomplete sentences may be an occasional consequence of the system secondary to software limitations, ambient noise and hardware issues  At the time of dictation, efforts were made to edit, clarify and /or correct errors  Please read the chart carefully and recognize, using context, where substitutions have occurred    If you have any questions or concerns about the context, text or information contained within the body of this dictation, please contact myself, the provider, for further clarification

## 2021-09-29 ENCOUNTER — OFFICE VISIT (OUTPATIENT)
Dept: GASTROENTEROLOGY | Facility: AMBULARY SURGERY CENTER | Age: 51
End: 2021-09-29
Payer: COMMERCIAL

## 2021-09-29 VITALS
DIASTOLIC BLOOD PRESSURE: 80 MMHG | SYSTOLIC BLOOD PRESSURE: 128 MMHG | HEIGHT: 64 IN | BODY MASS INDEX: 36.19 KG/M2 | WEIGHT: 212 LBS

## 2021-09-29 DIAGNOSIS — K22.70 BARRETT'S ESOPHAGUS WITHOUT DYSPLASIA: Primary | ICD-10-CM

## 2021-09-29 DIAGNOSIS — R10.12 LEFT UPPER QUADRANT ABDOMINAL PAIN: ICD-10-CM

## 2021-09-29 PROCEDURE — 99214 OFFICE O/P EST MOD 30 MIN: CPT | Performed by: INTERNAL MEDICINE

## 2021-09-29 NOTE — ASSESSMENT & PLAN NOTE
History of small Alvarez's esophagus     -  Continue Protonix  Discussed about the long-term side effects     -  Schedule for surveillance EGD    - advised to avoid fatty foods, fried foods, chocolates, eating late at night  -explained about the risks and benefits of the procedures  Risks including but limited bleeding, infection, perforation was explained in detail  Also explained about less than percent sensitivity with exam and other alternatives

## 2021-09-29 NOTE — ASSESSMENT & PLAN NOTE
Probable from some gastritis but appear to be most likely musculoskeletal pain  Patient reports the pain was resolved completely      -  Schedule for upper endoscopy

## 2021-09-29 NOTE — PROGRESS NOTES
Consultation - [address] [facility]  Claudia Cleaves 1970 female         Chief Complaint:   History of Alvarez's    HPI:  Ms Craig Ort this appointment couple of weeks ago when he had dull to sharp left upper quadrant pain but it has been resolved since then  She takes pantoprazole regularly for a small Alvarez's esophagus that was diagnosed in March 2019  Denies any heartburn acid reflux  Good appetite, no recent weight loss  She was recently started on meloxicam for back pain  Regular bowel movements and denies any blood or mucus in the stool  I did colonoscopy on her couple of years ago and removed polyps    REVIEW OF SYSTEMS: Review of Systems   Constitutional: Negative for activity change, appetite change, chills, diaphoresis, fatigue and unexpected weight change  HENT: Negative for ear discharge, ear pain, facial swelling, hearing loss, nosebleeds, sore throat, tinnitus and voice change  Eyes: Negative for pain, discharge, redness, itching and visual disturbance  Respiratory: Negative for apnea, cough, chest tightness, shortness of breath and wheezing  Cardiovascular: Negative for chest pain and palpitations  Gastrointestinal:        As noted in HPI   Endocrine: Negative for cold intolerance, heat intolerance and polyuria  Genitourinary: Negative for difficulty urinating, flank pain and urgency  Musculoskeletal: Positive for arthralgias and back pain  Negative for gait problem and joint swelling  Skin: Negative for rash and wound  Neurological: Negative for dizziness, tremors, seizures, speech difficulty, light-headedness and numbness  Hematological: Negative for adenopathy  Does not bruise/bleed easily  Psychiatric/Behavioral: Negative for agitation, behavioral problems and confusion  The patient is not nervous/anxious           Past Medical History:   Diagnosis Date    Acne     Anxiety     Alvarez esophagus     Colon polyp     Depression     GERD (gastroesophageal reflux disease)     Lower back pain     Morbidly obese (HCC)     PONV (postoperative nausea and vomiting)     Nausea    Vertigo     Wears glasses       Past Surgical History:   Procedure Laterality Date    BODY LIFT LOWER N/A 3/2/2017    Procedure: BODY LIFT ,Romanian BUTTOCK ;  Surgeon: Linus Mccann MD;  Location: [facility];  Service:     CHOLECYSTECTOMY      Laparoscopic    COLONOSCOPY      DILATION AND CURETTAGE OF UTERUS      LAPAROSCOPY FOR ECTOPIC PREGNANCY      x 2    LIPOSUCTION N/A 6/21/2019    Procedure: FLANKS/THIGHS LIPOSUCTION;  Surgeon: Linus Mccann MD;  [facility];  Service: Plastics    AZ ESOPHAGOGASTRODUODENOSCOPY TRANSORAL DIAGNOSTIC N/A 3/6/2019    Procedure: ESOPHAGOGASTRODUODENOSCOPY (EGD); Surgeon: Daniela Dillon MD;  [facility]; Service: Gastroenterology    AZ EXCISE EXCESS SKIN TISSUE,ABDOMEN N/A 9/1/2016    Procedure: PANNICULECTOMY,  PLICATION MUSCLES ;  Surgeon: Linus Mccann MD;  [facility];  Service: Plastics    AZ SUCT [place] N/A 9/1/2016    Procedure: LIPOSUCTION ABDOMEN ;  Surgeon: Linus Mccann MD;  [facility];  Service: Plastics    AZ SUSPENSION OF BREAST Bilateral 6/21/2019    Procedure: MASTOPEXY WITH FAT GRAFTING;  Surgeon: Linus Mccann MD;  [facility];  Service: Plastics    UPPER GASTROINTESTINAL ENDOSCOPY      WISDOM TOOTH EXTRACTION       Social History     Socioeconomic History    Marital status: /Civil Union     Spouse name: Not on file    Number of children: Not on file    Years of education: Not on file    Highest education level: Not on file   Occupational History    Not on file   Tobacco Use    Smoking status: Never Smoker    Smokeless tobacco: Never Used   Vaping Use    Vaping Use: Never used   Substance and Sexual Activity    Alcohol use: No    Drug use: Not Currently     Types: Marijuana     Comment: socially   stopped 12/21/19    Sexual activity: Not on file Other Topics Concern    Not on file   Social History Narrative    Not on file     Social Determinants of Health     Financial Resource Strain:     Difficulty of Paying Living Expenses:    Food Insecurity:     Worried About Running Out of Food in the Last Year:     [address] in the Last Year:    Transportation Needs:     Lack of Transportation (Medical):      Lack of Transportation (Non-Medical):    Physical Activity:     Days of Exercise per Week:     Minutes of Exercise per Session:    Stress:     Feeling of Stress :    Social Connections:     Frequency of Communication with Friends and Family:     Frequency of Social Gatherings with Friends and Family:     Attends Nondenominational Services:     Active Member of Clubs or Organizations:     Attends Club or Organization Meetings:     Marital Status:    Intimate Partner Violence:     Fear of Current or Ex-Partner:     Emotionally Abused:     Physically Abused:     Sexually Abused:      Family History   Adopted: Yes   Problem Relation Age of Onset    Multiple sclerosis Brother     Heart attack Neg Hx     Stroke Neg Hx     Anuerysm Neg Hx     Clotting disorder Neg Hx     Hypertension Neg Hx     Hyperlipidemia Neg Hx     Arrhythmia Neg Hx     Heart failure Neg Hx     Coronary artery disease Neg Hx     Sudden death Neg Hx         scd     Sertraline  Current Outpatient Medications   Medication Sig Dispense Refill    acetaminophen (TYLENOL) 325 mg tablet Take 650 mg by mouth every 6 (six) hours as needed for mild pain      cyclobenzaprine (FLEXERIL) 10 mg tablet Take 10 mg by mouth 3 (three) times a day as needed      dicyclomine (BENTYL) 10 mg capsule Take 1 capsule (10 mg total) by mouth 3 (three) times a day as needed (abdominal cramping) 90 capsule 0    Glucosamine-Chondroitin (OSTEO BI-FLEX REGULAR STRENGTH PO) Take by mouth      meclizine (ANTIVERT) 12 5 MG tablet Take 12 5 mg by mouth every 8 (eight) hours as needed        meloxicam (MOBIC) 7 5 mg tablet TAKE 1 2 TABLETS BY MOUTH DAILY AS NEEDED FOR PAIN      pantoprazole (PROTONIX) 40 mg tablet TAKE 1 TABLET BY MOUTH EVERY DAY 90 tablet 0    spironolactone (ALDACTONE) 50 mg tablet Take 50 mg by mouth daily   tretinoin (RETIN-A) 0 05 % cream Apply 1 application topically daily at bedtime as needed      Turmeric (QC TUMERIC COMPLEX PO) Take by mouth      ALPRAZolam (XANAX) 0 5 mg tablet Take 1 tab PO 2 hours prior to MRI, may repeat 1 hour prior to MRI if needed  Need    (Patient not taking: Reported on 9/20/2021) 2 tablet 0    BIOTIN PO Take 1 tablet by mouth daily  (Patient not taking: Reported on 9/20/2021)      fluticasone (FLONASE) 50 mcg/act nasal spray 2 sprays into each nostril as needed       No current facility-administered medications for this visit  Blood pressure 128/80, height 5' 4" (1 626 m), weight 96 2 kg (212 lb)  PHYSICAL EXAM: Physical Exam     Lab Results   Component Value Date    WBC 11 06 (H) 09/28/2018    HGB 13 8 09/28/2018    HCT 40 4 09/28/2018    MCV 90 09/28/2018     09/28/2018     Lab Results   Component Value Date    CALCIUM 8 9 09/28/2018    K 4 5 09/28/2018    CO2 21 09/28/2018    CL 99 (L) 09/28/2018    BUN 13 09/28/2018    CREATININE 0 70 09/28/2018     Lab Results   Component Value Date    ALT 11 10/30/2019    AST 14 10/30/2019    ALKPHOS 63 10/30/2019     Lab Results   Component Value Date    INR 0 96 09/28/2018    INR 1 01 09/06/2013    PROTIME 12 5 09/28/2018    PROTIME 12 8 09/06/2013       MRI brain IAC wo and w contrast    Result Date: 2/16/2021  Impression: 1  No acute intracranial pathology  2   No significant change in scattered foci of T2/FLAIR hyperintensity predominantly involving bilateral frontal subcortical white matter  Finding is nonspecific with main differential considerations including complex migraine disease, precocious microangiopathy, and collagen vascular disease    Demyelinating disease is less favored  Workstation performed: [id number]       ASSESSMENT & PLAN:    Left upper quadrant abdominal pain    Probable from some gastritis but appear to be most likely musculoskeletal pain  Patient reports the pain was resolved completely  -  Schedule for upper endoscopy    Alvarez's esophagus without dysplasia   History of small Alvarez's esophagus     -  Continue Protonix  Discussed about the long-term side effects     -  Schedule for surveillance EGD    - advised to avoid fatty foods, fried foods, chocolates, eating late at night  -explained about the risks and benefits of the procedures  Risks including but limited bleeding, infection, perforation was explained in detail  Also explained about less than percent sensitivity with exam and other alternatives

## 2021-10-04 ENCOUNTER — ANESTHESIA (OUTPATIENT)
Dept: ANESTHESIOLOGY | Facility: HOSPITAL | Age: 51
End: 2021-10-04

## 2021-10-04 ENCOUNTER — ANESTHESIA EVENT (OUTPATIENT)
Dept: ANESTHESIOLOGY | Facility: HOSPITAL | Age: 51
End: 2021-10-04

## 2021-10-05 ENCOUNTER — HOSPITAL ENCOUNTER (OUTPATIENT)
Dept: NON INVASIVE DIAGNOSTICS | Facility: HOSPITAL | Age: 51
Discharge: HOME | End: 2021-10-05

## 2021-10-05 DIAGNOSIS — K58.9 SPASM OF BOWEL: ICD-10-CM

## 2021-10-05 DIAGNOSIS — R07.9 CHEST PAIN, UNSPECIFIED TYPE: ICD-10-CM

## 2021-10-05 RX ORDER — DICYCLOMINE HYDROCHLORIDE 10 MG/1
10 CAPSULE ORAL 3 TIMES DAILY PRN
Qty: 90 CAPSULE | Refills: 0 | Status: SHIPPED | OUTPATIENT
Start: 2021-10-05 | End: 2021-10-20

## 2021-10-06 LAB
ARRHY DURING EX: NORMAL
CHEST PAIN STATEMENT: NORMAL
MAX DIASTOLIC BP: 58 MMHG
MAX HEART RATE: 179 BPM
MAX PREDICTED HEART RATE: 168 BPM
MAX. SYSTOLIC BP: 168 MMHG
PROTOCOL NAME: NORMAL
TARGET HR FORMULA: NORMAL
TIME IN EXERCISE PHASE: NORMAL

## 2021-10-13 ENCOUNTER — HOSPITAL ENCOUNTER (OUTPATIENT)
Dept: NON INVASIVE DIAGNOSTICS | Facility: HOSPITAL | Age: 51
Discharge: HOME | End: 2021-10-13
Payer: COMMERCIAL

## 2021-10-13 VITALS
BODY MASS INDEX: 36.19 KG/M2 | HEIGHT: 64 IN | SYSTOLIC BLOOD PRESSURE: 128 MMHG | WEIGHT: 212 LBS | DIASTOLIC BLOOD PRESSURE: 80 MMHG | HEART RATE: 87 BPM

## 2021-10-13 DIAGNOSIS — R07.9 CHEST PAIN, UNSPECIFIED TYPE: ICD-10-CM

## 2021-10-13 LAB
AORTIC ROOT: 3 CM
APICAL FOUR CHAMBER EJECTION FRACTION: 68 %
E WAVE DECELERATION TIME: 182 MS
E/A RATIO: 1.18
FRACTIONAL SHORTENING: 34 (ref 28–44)
INTERVENTRICULAR SEPTUM IN DIASTOLE (PARASTERNAL SHORT AXIS VIEW): 0.9 CM
LAAS-AP4: 19.5 CM2
LEFT INTERNAL DIMENSION IN SYSTOLE: 2.9 CM (ref 2.1–4)
LEFT VENTRICULAR INTERNAL DIMENSION IN DIASTOLE: 4.4 CM (ref 5.56–8.29)
LEFT VENTRICULAR POSTERIOR WALL IN END DIASTOLE: 0.8 CM
LEFT VENTRICULAR STROKE VOLUME: 53 ML
MV E'TISSUE VEL-LAT: 10 CM/S
MV PEAK A VEL: 0.6 M/S
MV PEAK E VEL: 71 CM/S
RIGHT ATRIAL 2D VOLUME: 37 ML
RIGHT VENTRICLE ID DIMENSION: 3.1 CM
SL CV PED ECHO LEFT VENTRICLE DIASTOLIC VOLUME (MOD BIPLANE) 2D: 87 ML
SL CV PED ECHO LEFT VENTRICLE SYSTOLIC VOLUME (MOD BIPLANE) 2D: 33 ML
TR PEAK VELOCITY: 2.4 M/S
TRICUSPID VALVE PEAK REGURGITATION VELOCITY: 2.42 M/S
TRICUSPID VALVE S': 55 CM/S
TV PEAK GRADIENT: 23 MMHG

## 2021-10-13 PROCEDURE — 93306 TTE W/DOPPLER COMPLETE: CPT | Performed by: INTERNAL MEDICINE

## 2021-10-13 PROCEDURE — 93306 TTE W/DOPPLER COMPLETE: CPT

## 2021-10-15 ENCOUNTER — TELEPHONE (OUTPATIENT)
Dept: GASTROENTEROLOGY | Facility: AMBULARY SURGERY CENTER | Age: 51
End: 2021-10-15

## 2021-10-18 ENCOUNTER — ANESTHESIA (OUTPATIENT)
Dept: GASTROENTEROLOGY | Facility: AMBULARY SURGERY CENTER | Age: 51
End: 2021-10-18

## 2021-10-18 ENCOUNTER — HOSPITAL ENCOUNTER (OUTPATIENT)
Dept: GASTROENTEROLOGY | Facility: AMBULARY SURGERY CENTER | Age: 51
Setting detail: OUTPATIENT SURGERY
Discharge: HOME | End: 2021-10-18
Attending: INTERNAL MEDICINE
Payer: COMMERCIAL

## 2021-10-18 ENCOUNTER — ANESTHESIA EVENT (OUTPATIENT)
Dept: GASTROENTEROLOGY | Facility: AMBULARY SURGERY CENTER | Age: 51
End: 2021-10-18

## 2021-10-18 VITALS
BODY MASS INDEX: 35.51 KG/M2 | DIASTOLIC BLOOD PRESSURE: 72 MMHG | HEIGHT: 64 IN | HEART RATE: 68 BPM | SYSTOLIC BLOOD PRESSURE: 124 MMHG | WEIGHT: 208 LBS | RESPIRATION RATE: 18 BRPM | TEMPERATURE: 97 F | OXYGEN SATURATION: 98 %

## 2021-10-18 DIAGNOSIS — K22.70 BARRETT'S ESOPHAGUS WITHOUT DYSPLASIA: ICD-10-CM

## 2021-10-18 DIAGNOSIS — R10.12 LEFT UPPER QUADRANT ABDOMINAL PAIN: ICD-10-CM

## 2021-10-18 PROBLEM — E66.9 CLASS 2 OBESITY IN ADULT: Status: ACTIVE | Noted: 2021-10-18

## 2021-10-18 PROBLEM — F32.A DEPRESSION: Status: ACTIVE | Noted: 2021-10-18

## 2021-10-18 PROBLEM — F41.9 ANXIETY: Status: ACTIVE | Noted: 2021-10-18

## 2021-10-18 LAB
EXT PREGNANCY TEST URINE: NEGATIVE
EXT. CONTROL: NORMAL

## 2021-10-18 PROCEDURE — 43239 EGD BIOPSY SINGLE/MULTIPLE: CPT | Performed by: INTERNAL MEDICINE

## 2021-10-18 PROCEDURE — 88305 TISSUE EXAM BY PATHOLOGIST: CPT | Performed by: PATHOLOGY

## 2021-10-18 PROCEDURE — 81025 URINE PREGNANCY TEST: CPT | Performed by: ANESTHESIOLOGY

## 2021-10-18 RX ORDER — PROPOFOL 10 MG/ML
INJECTION, EMULSION INTRAVENOUS AS NEEDED
Status: DISCONTINUED | OUTPATIENT
Start: 2021-10-18 | End: 2021-10-18

## 2021-10-18 RX ORDER — LIDOCAINE HYDROCHLORIDE 20 MG/ML
INJECTION, SOLUTION EPIDURAL; INFILTRATION; INTRACAUDAL; PERINEURAL AS NEEDED
Status: DISCONTINUED | OUTPATIENT
Start: 2021-10-18 | End: 2021-10-18

## 2021-10-18 RX ORDER — SODIUM CHLORIDE, SODIUM LACTATE, POTASSIUM CHLORIDE, CALCIUM CHLORIDE 600; 310; 30; 20 MG/100ML; MG/100ML; MG/100ML; MG/100ML
INJECTION, SOLUTION INTRAVENOUS CONTINUOUS PRN
Status: DISCONTINUED | OUTPATIENT
Start: 2021-10-18 | End: 2021-10-18

## 2021-10-18 RX ADMIN — PROPOFOL 130 MG: 10 INJECTION, EMULSION INTRAVENOUS at 11:02

## 2021-10-18 RX ADMIN — LIDOCAINE HYDROCHLORIDE 100 MG: 20 INJECTION, SOLUTION EPIDURAL; INFILTRATION; INTRACAUDAL at 11:02

## 2021-10-18 RX ADMIN — PROPOFOL 30 MG: 10 INJECTION, EMULSION INTRAVENOUS at 11:09

## 2021-10-18 RX ADMIN — PROPOFOL 50 MG: 10 INJECTION, EMULSION INTRAVENOUS at 11:05

## 2021-10-18 RX ADMIN — SODIUM CHLORIDE, SODIUM LACTATE, POTASSIUM CHLORIDE, AND CALCIUM CHLORIDE: .6; .31; .03; .02 INJECTION, SOLUTION INTRAVENOUS at 10:44

## 2021-10-20 DIAGNOSIS — K58.9 SPASM OF BOWEL: ICD-10-CM

## 2021-10-20 RX ORDER — DICYCLOMINE HYDROCHLORIDE 10 MG/1
10 CAPSULE ORAL 3 TIMES DAILY PRN
Qty: 270 CAPSULE | Refills: 1 | Status: SHIPPED | OUTPATIENT
Start: 2021-10-20

## 2022-09-28 NOTE — PROGRESS NOTES
Cardiology  Acute  Office Visit Note  Araceli Arndt   47 y o    female   MRN: [id number]  [address]  [place] [address] [facility] [place] [place] [address]  [phone]    PCP: Rob Santillan DO  Cardiologist: Dr Catherine Pate            Summary of recommendations  2 week ZIO patch  Echo  Follow up will be scheduled with Dr Catherine Pate 3 months        Assessment/plan  Chest pain, atypical    Short CA  CA interval 108 milliseconds  Will obtain a 2 week event recorder to rule out arrhythmia as a cause of her symptoms  /81  rare APCs Holter and stress  Mild MR- echo 10/21  Will reassess  GERD/Barretts esophagus-  Lipids: 4/10/21:  LDL 84, non   Hormonal acne- on spironolactone  Cardiac testing   TTE 10/18  EF 60%  No RWMA  Grade 1 DD  RV normal  LAE  · EKG stress 10/18  Richy protocol x 8 min and 1 sec  Target heart rate was achieved  There was no chest pain during stress  The stress ECG was negative for ischemia   Normal study after maximal exercise  · 24 hr Holter 10/18   0 premature ventricular contractions, 22 premature atrial contractions, normal heart rate variability with lowest 53, highest 136  In sinus rhythm throughout  Multiple symptoms correlate with normal sinus rhythm  · TTE 10/13/21  LV systolic function is normal   Wall motion is normal   Diastolic function is normal   RV systolic function normal   Mild MR  Mild TR  · EKG stress test 10/5/21 Duration of exercise was 9 min  Target heart rate was achieved  There was no chest pain during stress  Exercise stress test is negative for myocardial ischemia Exercise time is 9 minutes achieving 106% of MPHR with a total workload of 10 1 METs  No reported symptoms  Normal blood pressure response  Rare APCs          HPI  Evaristo Bazan is a 46 yo female who has had atypical chest pain, evaluated in the past  An echocardiogram, ekg stress test and Holter monitor were essentially normal in October 2018    She was evaluated by cardiologist Dr Mag Steward  9/17/21   Acute visit   Chief complaint:  Constant, squeezing left chest pain, occasional, intermittent palpitations, intermittent dizziness  EKG: Normal sinus rhythm 70 beats per minute  Normal intervals   Blood pressure:  112/70   HR 70/reg  Medications: Spironolactone 50 mg daily, for treatment of acne  Recently started on meloxicam, for low back pain  Social history:  She does not smoke  No regular alcohol  Occ coffee, occasionally drinks tea  ROS: She reports recently she has been experiencing constant, squeezing type left-sided chest pain underneath her left breast radiating laterally  She does not believe it is related to any particular foods  It can occur when she is lying down  Can occur when she walks  It can occur randomly  Nothing seems to make it better at this point  Occasional palpitations  She believes this is caused by some anxiety thinking about her chest pain  This is not particularly problematic, nor is the intermittent dizziness  Exam unremarkable  Will get a repeat lab analysis, EKG stress test and echocardiogram, for atypical chest pain     Will communicate any abnormal findings  She will return to the office p r n  Recommend GI evaluation as is scheduled    9/29/22  Acute OV  CC: Short of breath  Feels like her heart gets "stuck"  Sometimes holes to the left  Sometimes she can not take a deep breath  When she feels like this, she also feels her heart racing  This is random  Maybe once every few weeks lasting for a few minutes; she is not entirely clear  No exertional symptoms  EKG:  Normal sinus rhythm  Short LA, 108 milliseconds  We reviewed all of her prior testing  She has had some APCs  Her echo showed mild MR    Will get some updated testing: A 2 week event recorder, an echocardiogram     She will follow with her cardiologist in a few months            I have spent 40 minutes with Patient  today in which greater than 50% of this time was spent in counseling/coordination of care regarding Intructions for management, Patient and family education, Importance of tx compliance and Impressions  Assessment  Diagnoses and all orders for this visit:    Atypical chest pain  -     POCT ECG    Nonrheumatic mitral valve regurgitation  -     Echo complete w/ contrast if indicated; Future    Palpitations  -     AMB extended holter monitor; Future    Class 2 obesity due to excess calories with body mass index (BMI) of 35 0 to 35 9 in adult, unspecified whether serious comorbidity present    Other orders  -     predniSONE 10 mg tablet; PLEASE SEE ATTACHED FOR DETAILED DIRECTIONS (Patient not taking: Reported on 9/29/2022)  -     Cholecalciferol (Vitamin D3) 125 MCG (5000 UT) CAPS; Take by mouth          Past Medical History:   Diagnosis Date    Acne     Anxiety     Alvarez esophagus     Colon polyp     Depression     GERD (gastroesophageal reflux disease)     Lower back pain     Morbidly obese (HCC)     PONV (postoperative nausea and vomiting)     Nausea    Vertigo     Wears glasses        Review of Systems   Constitutional: Negative for chills  Cardiovascular: Negative for chest pain, claudication, cyanosis, dyspnea on exertion, irregular heartbeat, leg swelling, near-syncope, orthopnea, palpitations, paroxysmal nocturnal dyspnea and syncope  Respiratory: Negative for cough and shortness of breath  Gastrointestinal: Negative for heartburn and nausea  Neurological: Negative for dizziness, focal weakness, headaches, light-headedness and weakness  All other systems reviewed and are negative  No Known Allergies        Current Outpatient Medications:     acetaminophen (TYLENOL) 325 mg tablet, Take 650 mg by mouth every 6 (six) hours as needed for mild pain, Disp: , Rfl:     Cholecalciferol (Vitamin D3) 125 MCG (5000 UT) CAPS, Take by mouth, Disp: , Rfl:     cyclobenzaprine (FLEXERIL) 10 mg tablet, Take 10 mg by mouth 3 (three) times a day as needed, Disp: , Rfl:     dicyclomine (BENTYL) 10 mg capsule, TAKE 1 CAPSULE (10 MG TOTAL) BY MOUTH 3 (THREE) TIMES A DAY AS NEEDED (ABDOMINAL CRAMPING), Disp: 270 capsule, Rfl: 1    Glucosamine-Chondroitin (OSTEO BI-FLEX REGULAR STRENGTH PO), Take by mouth, Disp: , Rfl:     meclizine (ANTIVERT) 12 5 MG tablet, Take 12 5 mg by mouth every 8 (eight) hours as needed  , Disp: , Rfl:     meloxicam (MOBIC) 7 5 mg tablet, TAKE 1 2 TABLETS BY MOUTH DAILY AS NEEDED FOR PAIN, Disp: , Rfl:     spironolactone (ALDACTONE) 50 mg tablet, Take 50 mg by mouth daily  , Disp: , Rfl:     Turmeric (QC TUMERIC COMPLEX PO), Take by mouth, Disp: , Rfl:     fluticasone (FLONASE) 50 mcg/act nasal spray, 2 sprays into each nostril as needed, Disp: , Rfl:     pantoprazole (PROTONIX) 40 mg tablet, TAKE 1 TABLET BY MOUTH EVERY DAY, Disp: 90 tablet, Rfl: 0    predniSONE 10 mg tablet, PLEASE SEE ATTACHED FOR DETAILED DIRECTIONS (Patient not taking: Reported on 9/29/2022), Disp: , Rfl:     tretinoin (RETIN-A) 0 05 % cream, Apply 1 application topically daily at bedtime as needed, Disp: , Rfl:         Social History     Socioeconomic History    Marital status: /Civil Union     Spouse name: Not on file    Number of children: Not on file    Years of education: Not on file    Highest education level: Not on file   Occupational History    Not on file   Tobacco Use    Smoking status: Never Smoker    Smokeless tobacco: Never Used   Vaping Use    Vaping Use: Never used   Substance and Sexual Activity    Alcohol use: No    Drug use: Not Currently     Types: Marijuana     Comment: socially   stopped 12/21/19    Sexual activity: Not on file   Other Topics Concern    Not on file   Social History Narrative    Not on file     Social Determinants of Health     Financial Resource Strain: Not on file   Food Insecurity: Not on file   Transportation Needs: Not on file   Physical Activity: Not on file   Stress: Not on file Social Connections: Not on file   Intimate Partner Violence: Not on file   Housing Stability: Not on file       Family History   Adopted: Yes   Problem Relation Age of Onset    Multiple sclerosis Brother     Heart attack Neg Hx     Stroke Neg Hx     Anuerysm Neg Hx     Clotting disorder Neg Hx     Hypertension Neg Hx     Hyperlipidemia Neg Hx     Arrhythmia Neg Hx     Heart failure Neg Hx     Coronary artery disease Neg Hx     Sudden death Neg Hx         scd       Physical Exam  Vitals and nursing note reviewed  Constitutional:       General: She is not in acute distress  Appearance: She is not diaphoretic  HENT:      Head: Normocephalic and atraumatic  Eyes:      Conjunctiva/sclera: Conjunctivae normal    Cardiovascular:      Rate and Rhythm: Normal rate and regular rhythm  Pulses: Intact distal pulses  Heart sounds: Normal heart sounds  Pulmonary:      Effort: Pulmonary effort is normal       Breath sounds: Normal breath sounds  Abdominal:      General: Bowel sounds are normal       Palpations: Abdomen is soft  Musculoskeletal:         General: Normal range of motion  Cervical back: Normal range of motion and neck supple  Skin:     General: Skin is warm and dry  Neurological:      Mental Status: She is alert and oriented to person, place, and time  Vitals: Blood pressure 113/79, pulse 85, resp  rate 18, height 5' 4" (1 626 m), weight 98 7 kg (217 lb 8 oz), SpO2 99 %     Wt Readings from Last 3 Encounters:   09/29/22 98 7 kg (217 lb 8 oz)   10/18/21 94 3 kg (208 lb)   10/13/21 96 2 kg (212 lb)         Labs & Results:  Lab Results   Component Value Date    WBC 11 06 (H) 09/28/2018    HGB 13 8 09/28/2018    HCT 40 4 09/28/2018    MCV 90 09/28/2018     09/28/2018     No results found for: BNP  No components found for: CHEM  Troponin I   Date Value Ref Range Status   09/28/2018 <0 02 <=0 04 ng/mL Final     Comment:       Siemens Chemistry analyzer 99% cutoff is > 0 04 ng/mL in network labs     o cTnI 99% cutoff is useful only when applied to patients in the clinical setting of myocardial ischemia   o cTnI 99% cutoff should be interpreted in the context of clinical history, ECG findings and possibly cardiac imaging to establish correct diagnosis  o cTnI 99% cutoff may be suggestive but clearly not indicative of a coronary event without the clinical setting of myocardial ischemia      2018 <0 02 <=0 04 ng/mL Final     Comment:       S[employer]ens Chemistry analyzer 99% cutoff is > 0 04 ng/mL in network labs     o cTnI 99% cutoff is useful only when applied to patients in the clinical setting of myocardial ischemia   o cTnI 99% cutoff should be interpreted in the context of clinical history, ECG findings and possibly cardiac imaging to establish correct diagnosis  o cTnI 99% cutoff may be suggestive but clearly not indicative of a coronary event without the clinical setting of myocardial ischemia      2018 <0 02 <=0 04 ng/mL Final     Comment:       Siemens Chemistry analyzer 99% cutoff is > 0 04 ng/mL in network labs     o cTnI 99% cutoff is useful only when applied to patients in the clinical setting of myocardial ischemia   o cTnI 99% cutoff should be interpreted in the context of clinical history, ECG findings and possibly cardiac imaging to establish correct diagnosis  o cTnI 99% cutoff may be suggestive but clearly not indicative of a coronary event without the clinical setting of myocardial ischemia         Results for orders placed during the hospital encounter of 10/18/18    Echo complete with contrast if indicated    Narrative  [facility] [address] [facility]  [phone]    Transthoracic Echocardiogram  2D, M-mode, Doppler, and Color Doppler    Study date:  18-Oct-2018    Patient: Bret Hernandez  MR number: [id number]  Account number: [de-identified]  : 1970  Age: 51 years  Gender: Female  Status: Outpatient  Location: [facility]  Height: 63 in  Weight: 210 5 lb  BP: 117/ 59 mmHg    Indications: Chest Pain    Diagnoses: R07 9 - Chest pain, unspecified    Sonographer:  Rogerio Rinne, RDCS  Primary Physician:  Rob Santillan DO  Referring Physician:  Darek Barreto MD  Group:  [facility]  Interpreting Physician:  Jorge Schwartz MD    SUMMARY    LEFT VENTRICLE:  Systolic function was normal  Ejection fraction was estimated to be 60 %  There were no regional wall motion abnormalities  Doppler parameters were consistent with abnormal left ventricular relaxation (grade 1 diastolic dysfunction)  LEFT ATRIUM:  The atrium was dilated  HISTORY: PRIOR HISTORY: Shortness of Breath    PROCEDURE: The study was performed in the [facility]  This was a routine study  The transthoracic approach was used  The study included complete 2D imaging, M-mode, complete spectral Doppler, and color Doppler  The  heart rate was 69 bpm, at the start of the study  Images were obtained from the parasternal, apical, subcostal, and suprasternal notch acoustic windows  Echocardiographic views were limited due to poor acoustic window availability,  decreased penetration, and lung interference  This was a technically difficult study  LEFT VENTRICLE: Size was normal  Systolic function was normal  Ejection fraction was estimated to be 60 %  There were no regional wall motion abnormalities  Wall thickness was normal  DOPPLER: There was an increased relative contribution  of atrial contraction to ventricular filling  Doppler parameters were consistent with abnormal left ventricular relaxation (grade 1 diastolic dysfunction)  RIGHT VENTRICLE: The size was normal  Systolic function was normal  Wall thickness was normal     LEFT ATRIUM: The atrium was dilated      RIGHT ATRIUM: Size was normal     MITRAL VALVE: Valve structure was normal  There was normal leaflet separation  DOPPLER: The transmitral velocity was within the normal range  There was no evidence for stenosis  There was trace regurgitation  AORTIC VALVE: The valve was trileaflet  Leaflets exhibited normal thickness and normal cuspal separation  DOPPLER: Transaortic velocity was within the normal range  There was no evidence for stenosis  There was no significant  regurgitation  TRICUSPID VALVE: The valve structure was normal  There was normal leaflet separation  DOPPLER: The transtricuspid velocity was within the normal range  There was no evidence for stenosis  There was trace regurgitation  Pulmonary artery  systolic pressure was within the normal range  Estimated peak PA pressure was 28 mmHg  PULMONIC VALVE: Leaflets exhibited normal thickness, no calcification, and normal cuspal separation  DOPPLER: The transpulmonic velocity was within the normal range  There was trace regurgitation  PERICARDIUM: There was no pericardial effusion  The pericardium was normal in appearance  AORTA: The root exhibited normal size  SYSTEMIC VEINS: IVC: The inferior vena cava was normal in size  Respirophasic changes were normal     SYSTEM MEASUREMENT TABLES    2D  %FS: 31 18 %  EF(Teich): 59 37 %  IVSd: 0 8 cm  LA Diam: 3 42 cm  LVIDd: 4 17 cm  LVIDs: 2 87 cm  LVPWd: 1 01 cm    CW  TR MaxP 04 mmHg  TR Vmax: 2 5 m/s    MM  TAPSE: 2 77 cm    [employer] Accredited Echocardiography Laboratory    Prepared and electronically signed by    Cari Lesches, MD  Signed 18-Oct-2018 15:35:01    No results found for this or any previous visit  This note was completed in part utilizing m-modal fluency direct voice recognition software  Grammatical errors, random word insertion, spelling mistakes, and incomplete sentences may be an occasional consequence of the system secondary to software limitations, ambient noise and hardware issues   At the time of dictation, efforts were made to edit, clarify and /or correct errors  Please read the chart carefully and recognize, using context, where substitutions have occurred    If you have any questions or concerns about the context, text or information contained within the body of this dictation, please contact myself, the provider, for further clarification

## 2022-09-29 ENCOUNTER — OFFICE VISIT (OUTPATIENT)
Dept: CARDIOLOGY CLINIC | Facility: CLINIC | Age: 52
End: 2022-09-29
Payer: COMMERCIAL

## 2022-09-29 VITALS
DIASTOLIC BLOOD PRESSURE: 79 MMHG | HEIGHT: 64 IN | WEIGHT: 217.5 LBS | SYSTOLIC BLOOD PRESSURE: 113 MMHG | BODY MASS INDEX: 37.13 KG/M2 | OXYGEN SATURATION: 99 % | HEART RATE: 85 BPM | RESPIRATION RATE: 18 BRPM

## 2022-09-29 DIAGNOSIS — E66.09 CLASS 2 OBESITY DUE TO EXCESS CALORIES WITH BODY MASS INDEX (BMI) OF 35.0 TO 35.9 IN ADULT, UNSPECIFIED WHETHER SERIOUS COMORBIDITY PRESENT: ICD-10-CM

## 2022-09-29 DIAGNOSIS — R07.89 ATYPICAL CHEST PAIN: Primary | ICD-10-CM

## 2022-09-29 DIAGNOSIS — R00.2 PALPITATIONS: ICD-10-CM

## 2022-09-29 DIAGNOSIS — I34.0 NONRHEUMATIC MITRAL VALVE REGURGITATION: ICD-10-CM

## 2022-09-29 PROCEDURE — 93000 ELECTROCARDIOGRAM COMPLETE: CPT | Performed by: INTERNAL MEDICINE

## 2022-09-29 PROCEDURE — 99215 OFFICE O/P EST HI 40 MIN: CPT | Performed by: INTERNAL MEDICINE

## 2022-09-29 RX ORDER — PREDNISONE 10 MG/1
TABLET ORAL
COMMUNITY
Start: 2022-09-07

## 2022-09-29 NOTE — PATIENT INSTRUCTIONS

## 2022-09-29 NOTE — LETTER
September 29, 2022     Sky Maciel DO  [address] [facility]  [place]   [address]  [phone]    Patient: Evangelist Mejia   YOB: 1970   Date of Visit: 9/29/2022       Dear Dr Bernadette Dasilva: Thank you for referring Susanna Chavez to me for evaluation  Below are my notes for this consultation  If you have questions, please do not hesitate to call me  I look forward to following your patient along with you  Sincerely,        EVELIA Hansen        CC: MD Leny Adkins, [address]  9/29/2022  7:50 AM  Sign when Signing Visit  Cardiology  Acute  Office Visit Note  Evangelist Mejia   47 y o    female   MRN: [id number]  [address]  [place] [address] [facility] [address]  [phone]  [phone]    PCP: Sky Maciel DO  Cardiologist: Dr Everton Stock            Summary of recommendations  2 week ZIO patch  Echo  Follow up will be scheduled with Dr Everton Stock 3 months        Assessment/plan  Chest pain, atypical    Short OR  OR interval 108 milliseconds  Will obtain a 2 week event recorder to rule out arrhythmia as a cause of her symptoms  /81  rare APCs Holter and stress  Mild MR- echo 10/21  Will reassess  GERD/Barretts esophagus-  Lipids: 4/10/21:  LDL 84, non   Hormonal acne- on spironolactone  Cardiac testing   TTE 10/18  EF 60%  No RWMA  Grade 1 DD  RV normal  LAE  · EKG stress 10/18  Richy protocol x 8 min and 1 sec  Target heart rate was achieved  There was no chest pain during stress  The stress ECG was negative for ischemia   Normal study after maximal exercise  · 24 hr Holter 10/18   0 premature ventricular contractions, 22 premature atrial contractions, normal heart rate variability with lowest 53, highest 136  In sinus rhythm throughout  Multiple symptoms correlate with normal sinus rhythm  · TTE 10/13/21    LV systolic function is normal   Wall motion is normal   Diastolic function is normal   RV systolic function normal  Mild MR  Mild TR  · EKG stress test 10/5/21 Duration of exercise was 9 min  Target heart rate was achieved  There was no chest pain during stress  Exercise stress test is negative for myocardial ischemia Exercise time is 9 minutes achieving 106% of MPHR with a total workload of 10 1 METs  No reported symptoms  Normal blood pressure response  Rare APCs          HPI  Shea Khan is a 44 yo female who has had atypical chest pain, evaluated in the past  An echocardiogram, ekg stress test and Holter monitor were essentially normal in October 2018  She was evaluated by cardiologist Dr Osmany Perry  9/17/21   Acute visit   Chief complaint:  Constant, squeezing left chest pain, occasional, intermittent palpitations, intermittent dizziness  EKG: Normal sinus rhythm 70 beats per minute  Normal intervals   Blood pressure:  112/70   HR 70/reg  Medications: Spironolactone 50 mg daily, for treatment of acne  Recently started on meloxicam, for low back pain  Social history:  She does not smoke  No regular alcohol  Occ coffee, occasionally drinks tea  ROS: She reports recently she has been experiencing constant, squeezing type left-sided chest pain underneath her left breast radiating laterally  She does not believe it is related to any particular foods  It can occur when she is lying down  Can occur when she walks  It can occur randomly  Nothing seems to make it better at this point  Occasional palpitations  She believes this is caused by some anxiety thinking about her chest pain  This is not particularly problematic, nor is the intermittent dizziness  Exam unremarkable  Will get a repeat lab analysis, EKG stress test and echocardiogram, for atypical chest pain     Will communicate any abnormal findings  She will return to the office p r n  Recommend GI evaluation as is scheduled    9/29/22  Acute OV  CC: Short of breath  Feels like her heart gets "stuck"  Sometimes holes to the left    Sometimes she can not take a deep breath  When she feels like this, she also feels her heart racing  This is random  Maybe once every few weeks lasting for a few minutes; she is not entirely clear  No exertional symptoms  EKG:  Normal sinus rhythm  Short AZ, 108 milliseconds  We reviewed all of her prior testing  She has had some APCs  Her echo showed mild MR  Will get some updated testing: A 2 week event recorder, an echocardiogram     She will follow with her cardiologist in a few months            I have spent 40 minutes with Patient  today in which greater than 50% of this time was spent in counseling/coordination of care regarding Intructions for management, Patient and family education, Importance of tx compliance and Impressions  Assessment  Diagnoses and all orders for this visit:    Atypical chest pain  -     POCT ECG    Nonrheumatic mitral valve regurgitation  -     Echo complete w/ contrast if indicated; Future    Palpitations  -     AMB extended holter monitor; Future    Class 2 obesity due to excess calories with body mass index (BMI) of 35 0 to 35 9 in adult, unspecified whether serious comorbidity present    Other orders  -     predniSONE 10 mg tablet; PLEASE SEE ATTACHED FOR DETAILED DIRECTIONS (Patient not taking: Reported on 9/29/2022)  -     Cholecalciferol (Vitamin D3) 125 MCG (5000 UT) CAPS; Take by mouth          Past Medical History:   Diagnosis Date    Acne     Anxiety     Alvarez esophagus     Colon polyp     Depression     GERD (gastroesophageal reflux disease)     Lower back pain     Morbidly obese (HCC)     PONV (postoperative nausea and vomiting)     Nausea    Vertigo     Wears glasses        Review of Systems   Constitutional: Negative for chills  Cardiovascular: Negative for chest pain, claudication, cyanosis, dyspnea on exertion, irregular heartbeat, leg swelling, near-syncope, orthopnea, palpitations, paroxysmal nocturnal dyspnea and syncope     Respiratory: Negative for cough and shortness of breath  Gastrointestinal: Negative for heartburn and nausea  Neurological: Negative for dizziness, focal weakness, headaches, light-headedness and weakness  All other systems reviewed and are negative  No Known Allergies    Current Outpatient Medications:     acetaminophen (TYLENOL) 325 mg tablet, Take 650 mg by mouth every 6 (six) hours as needed for mild pain, Disp: , Rfl:     Cholecalciferol (Vitamin D3) 125 MCG (5000 UT) CAPS, Take by mouth, Disp: , Rfl:     cyclobenzaprine (FLEXERIL) 10 mg tablet, Take 10 mg by mouth 3 (three) times a day as needed, Disp: , Rfl:     dicyclomine (BENTYL) 10 mg capsule, TAKE 1 CAPSULE (10 MG TOTAL) BY MOUTH 3 (THREE) TIMES A DAY AS NEEDED (ABDOMINAL CRAMPING), Disp: 270 capsule, Rfl: 1    Glucosamine-Chondroitin (OSTEO BI-FLEX REGULAR STRENGTH PO), Take by mouth, Disp: , Rfl:     meclizine (ANTIVERT) 12 5 MG tablet, Take 12 5 mg by mouth every 8 (eight) hours as needed  , Disp: , Rfl:     meloxicam (MOBIC) 7 5 mg tablet, TAKE 1 2 TABLETS BY MOUTH DAILY AS NEEDED FOR PAIN, Disp: , Rfl:     spironolactone (ALDACTONE) 50 mg tablet, Take 50 mg by mouth daily  , Disp: , Rfl:     Turmeric (QC TUMERIC COMPLEX PO), Take by mouth, Disp: , Rfl:     fluticasone (FLONASE) 50 mcg/act nasal spray, 2 sprays into each nostril as needed, Disp: , Rfl:     pantoprazole (PROTONIX) 40 mg tablet, TAKE 1 TABLET BY MOUTH EVERY DAY, Disp: 90 tablet, Rfl: 0    predniSONE 10 mg tablet, PLEASE SEE ATTACHED FOR DETAILED DIRECTIONS (Patient not taking: Reported on 9/29/2022), Disp: , Rfl:     tretinoin (RETIN-A) 0 05 % cream, Apply 1 application topically daily at bedtime as needed, Disp: , Rfl:         Social History     Socioeconomic History    Marital status: /Civil Union     Spouse name: Not on file    Number of children: Not on file    Years of education: Not on file    Highest education level: Not on file   Occupational History    Not on file   Tobacco Use    Smoking status: Never Smoker    Smokeless tobacco: Never Used   Vaping Use    Vaping Use: Never used   Substance and Sexual Activity    Alcohol use: No    Drug use: Not Currently     Types: Marijuana     Comment: socially  stopped 12/21/19    Sexual activity: Not on file   Other Topics Concern    Not on file   Social History Narrative    Not on file     Social Determinants of Health     Financial Resource Strain: Not on file   Food Insecurity: Not on file   Transportation Needs: Not on file   Physical Activity: Not on file   Stress: Not on file   Social Connections: Not on file   Intimate Partner Violence: Not on file   Housing Stability: Not on file       Family History   Adopted: Yes   Problem Relation Age of Onset    Multiple sclerosis Brother     Heart attack Neg Hx     Stroke Neg Hx     Anuerysm Neg Hx     Clotting disorder Neg Hx     Hypertension Neg Hx     Hyperlipidemia Neg Hx     Arrhythmia Neg Hx     Heart failure Neg Hx     Coronary artery disease Neg Hx     Sudden death Neg Hx         scd       Physical Exam  Vitals and nursing note reviewed  Constitutional:       General: She is not in acute distress  Appearance: She is not diaphoretic  HENT:      Head: Normocephalic and atraumatic  Eyes:      Conjunctiva/sclera: Conjunctivae normal    Cardiovascular:      Rate and Rhythm: Normal rate and regular rhythm  Pulses: Intact distal pulses  Heart sounds: Normal heart sounds  Pulmonary:      Effort: Pulmonary effort is normal       Breath sounds: Normal breath sounds  Abdominal:      General: Bowel sounds are normal       Palpations: Abdomen is soft  Musculoskeletal:         General: Normal range of motion  Cervical back: Normal range of motion and neck supple  Skin:     General: Skin is warm and dry  Neurological:      Mental Status: She is alert and oriented to person, place, and time  Vitals: Blood pressure 113/79, pulse 85, resp   rate 18, height 5' 4" (1 626 m), weight 98 7 kg (217 lb 8 oz), SpO2 99 %  Wt Readings from Last 3 Encounters:   09/29/22 98 7 kg (217 lb 8 oz)   10/18/21 94 3 kg (208 lb)   10/13/21 96 2 kg (212 lb)         Labs & Results:  Lab Results   Component Value Date    WBC 11 06 (H) 09/28/2018    HGB 13 8 09/28/2018    HCT 40 4 09/28/2018    MCV 90 09/28/2018     09/28/2018     No results found for: BNP  No components found for: CHEM  Troponin I   Date Value Ref Range Status   09/28/2018 <0 02 <=0 04 ng/mL Final     Comment:       Siemens Chemistry analyzer 99% cutoff is > 0 04 ng/mL in network labs     o cTnI 99% cutoff is useful only when applied to patients in the clinical setting of myocardial ischemia   o cTnI 99% cutoff should be interpreted in the context of clinical history, ECG findings and possibly cardiac imaging to establish correct diagnosis  o cTnI 99% cutoff may be suggestive but clearly not indicative of a coronary event without the clinical setting of myocardial ischemia      09/28/2018 <0 02 <=0 04 ng/mL Final     Comment:       Siemens Chemistry analyzer 99% cutoff is > 0 04 ng/mL in network labs     o cTnI 99% cutoff is useful only when applied to patients in the clinical setting of myocardial ischemia   o cTnI 99% cutoff should be interpreted in the context of clinical history, ECG findings and possibly cardiac imaging to establish correct diagnosis  o cTnI 99% cutoff may be suggestive but clearly not indicative of a coronary event without the clinical setting of myocardial ischemia      09/28/2018 <0 02 <=0 04 ng/mL Final     Comment:       Siemens Chemistry analyzer 99% cutoff is > 0 04 ng/mL in network labs     o cTnI 99% cutoff is useful only when applied to patients in the clinical setting of myocardial ischemia   o cTnI 99% cutoff should be interpreted in the context of clinical history, ECG findings and possibly cardiac imaging to establish correct diagnosis     o cTnI 99% cutoff may be suggestive but clearly not indicative of a coronary event without the clinical setting of myocardial ischemia  Results for orders placed during the hospital encounter of 10/18/18    Echo complete with contrast if indicated    Narrative  [facility], [address] [facility]  [phone]    Transthoracic Echocardiogram  2D, M-mode, Doppler, and Color Doppler    Study date:  18-Oct-2018    Patient: Kizzy Duarte  MR number: [id number]  Account number: [de-identified]  : 1970  Age: 51 years  Gender: Female  Status: Outpatient  Location: [address] [place] [address]  Height: 63 in  Weight: 210 5 lb  BP: 117/ 59 mmHg    Indications: Chest Pain    Diagnoses: R07 9 - Chest pain, unspecified    Sonographer:  Brandon Cameron RDCS  Primary Physician:  Katie Fritz DO  Referring Physician:  Laurence Galvez MD  Group:  [facility]  Interpreting Physician:  Tess Woods MD    SUMMARY    LEFT VENTRICLE:  Systolic function was normal  Ejection fraction was estimated to be 60 %  There were no regional wall motion abnormalities  Doppler parameters were consistent with abnormal left ventricular relaxation (grade 1 diastolic dysfunction)  LEFT ATRIUM:  The atrium was dilated  HISTORY: PRIOR HISTORY: Shortness of Breath    PROCEDURE: The study was performed in the [address] [place] [address]  This was a routine study  The transthoracic approach was used  The study included complete 2D imaging, M-mode, complete spectral Doppler, and color Doppler  The  heart rate was 69 bpm, at the start of the study  Images were obtained from the parasternal, apical, subcostal, and suprasternal notch acoustic windows  Echocardiographic views were limited due to poor acoustic window availability,  decreased penetration, and lung interference  This was a technically difficult study      LEFT VENTRICLE: Size was normal  Systolic function was normal  Ejection fraction was estimated to be 60 %  There were no regional wall motion abnormalities  Wall thickness was normal  DOPPLER: There was an increased relative contribution  of atrial contraction to ventricular filling  Doppler parameters were consistent with abnormal left ventricular relaxation (grade 1 diastolic dysfunction)  RIGHT VENTRICLE: The size was normal  Systolic function was normal  Wall thickness was normal     LEFT ATRIUM: The atrium was dilated  RIGHT ATRIUM: Size was normal     MITRAL VALVE: Valve structure was normal  There was normal leaflet separation  DOPPLER: The transmitral velocity was within the normal range  There was no evidence for stenosis  There was trace regurgitation  AORTIC VALVE: The valve was trileaflet  Leaflets exhibited normal thickness and normal cuspal separation  DOPPLER: Transaortic velocity was within the normal range  There was no evidence for stenosis  There was no significant  regurgitation  TRICUSPID VALVE: The valve structure was normal  There was normal leaflet separation  DOPPLER: The transtricuspid velocity was within the normal range  There was no evidence for stenosis  There was trace regurgitation  Pulmonary artery  systolic pressure was within the normal range  Estimated peak PA pressure was 28 mmHg  PULMONIC VALVE: Leaflets exhibited normal thickness, no calcification, and normal cuspal separation  DOPPLER: The transpulmonic velocity was within the normal range  There was trace regurgitation  PERICARDIUM: There was no pericardial effusion  The pericardium was normal in appearance  AORTA: The root exhibited normal size  SYSTEMIC VEINS: IVC: The inferior vena cava was normal in size   Respirophasic changes were normal     SYSTEM MEASUREMENT TABLES    2D  %FS: 31 18 %  EF(Teich): 59 37 %  IVSd: 0 8 cm  LA Diam: 3 42 cm  LVIDd: 4 17 cm  LVIDs: 2 87 cm  LVPWd: 1 01 cm    CW  TR MaxP 04 mmHg  TR Vmax: 2 5 m/s    MM  TAPSE: 2 77 cm    [employer] Accredited Echocardiography Laboratory    Prepared and electronically signed by    Bita Turner MD  Signed 18-Oct-2018 15:35:01    No results found for this or any previous visit  This note was completed in part utilizing m-TESARO fluency direct voice recognition software  Grammatical errors, random word insertion, spelling mistakes, and incomplete sentences may be an occasional consequence of the system secondary to software limitations, ambient noise and hardware issues  At the time of dictation, efforts were made to edit, clarify and /or correct errors  Please read the chart carefully and recognize, using context, where substitutions have occurred    If you have any questions or concerns about the context, text or information contained within the body of this dictation, please contact myself, the provider, for further clarification

## 2022-10-11 NOTE — TELEPHONE ENCOUNTER
----- Message from Jeff Aguiar MD sent at 3/9/2019  3:05 PM EST -----  Gastric biopsies are benign and negative for H pylori  Distal esophageal biopsies are positive for small Alvarez's  Patient to continue Protonix    Office visit with PA in 2 months to explain    EGD in 2 years Normal

## 2022-10-14 ENCOUNTER — HOSPITAL ENCOUNTER (OUTPATIENT)
Dept: NON INVASIVE DIAGNOSTICS | Facility: HOSPITAL | Age: 52
Discharge: HOME | End: 2022-10-14
Payer: COMMERCIAL

## 2022-10-14 VITALS
HEART RATE: 70 BPM | WEIGHT: 217 LBS | HEIGHT: 64 IN | BODY MASS INDEX: 37.05 KG/M2 | DIASTOLIC BLOOD PRESSURE: 79 MMHG | SYSTOLIC BLOOD PRESSURE: 113 MMHG

## 2022-10-14 DIAGNOSIS — I34.0 NONRHEUMATIC MITRAL VALVE REGURGITATION: ICD-10-CM

## 2022-10-14 LAB
AORTIC ROOT: 3.1 CM
E WAVE DECELERATION TIME: 190 MS
FRACTIONAL SHORTENING: 37 (ref 28–44)
INTERVENTRICULAR SEPTUM IN DIASTOLE (PARASTERNAL SHORT AXIS VIEW): 1.1 CM
INTERVENTRICULAR SEPTUM: 1.1 CM (ref 0.6–1.1)
LEFT ATRIUM SIZE: 4 CM
LEFT INTERNAL DIMENSION IN SYSTOLE: 2.6 CM (ref 2.1–4)
LEFT VENTRICULAR INTERNAL DIMENSION IN DIASTOLE: 4.1 CM (ref 3.5–6)
LEFT VENTRICULAR POSTERIOR WALL IN END DIASTOLE: 1.1 CM
LEFT VENTRICULAR STROKE VOLUME: 49 ML
LVSV (TEICH): 49 ML
MV E'TISSUE VEL-SEP: 9 CM/S
MV PEAK A VEL: 0.84 M/S
MV PEAK E VEL: 61 CM/S
MV STENOSIS PRESSURE HALF TIME: 55 MS
MV VALVE AREA P 1/2 METHOD: 4
SL CV PED ECHO LEFT VENTRICLE DIASTOLIC VOLUME (MOD BIPLANE) 2D: 73 ML
SL CV PED ECHO LEFT VENTRICLE SYSTOLIC VOLUME (MOD BIPLANE) 2D: 24 ML
TR MAX PG: 19 MMHG
TR PEAK VELOCITY: 2.2 M/S
TRICUSPID VALVE PEAK REGURGITATION VELOCITY: 2.15 M/S

## 2022-10-14 PROCEDURE — 93306 TTE W/DOPPLER COMPLETE: CPT

## 2022-10-14 PROCEDURE — 93306 TTE W/DOPPLER COMPLETE: CPT | Performed by: INTERNAL MEDICINE

## 2023-08-02 ENCOUNTER — HOSPITAL ENCOUNTER (OUTPATIENT)
Dept: MRI IMAGING | Facility: HOSPITAL | Age: 53
Discharge: HOME | End: 2023-08-02
Payer: COMMERCIAL

## 2023-08-02 DIAGNOSIS — H93.8X2 EAR PRESSURE, LEFT: ICD-10-CM

## 2023-08-02 DIAGNOSIS — R42 RECURRENT VERTIGO: ICD-10-CM

## 2023-08-02 PROCEDURE — G1004 CDSM NDSC: HCPCS

## 2023-08-02 PROCEDURE — 70553 MRI BRAIN STEM W/O & W/DYE: CPT

## 2023-08-02 PROCEDURE — A9585 GADOBUTROL INJECTION: HCPCS | Performed by: PHYSICIAN ASSISTANT

## 2023-08-02 RX ORDER — GADOBUTROL 604.72 MG/ML
10 INJECTION INTRAVENOUS
Status: COMPLETED | OUTPATIENT
Start: 2023-08-02 | End: 2023-08-02

## 2023-08-02 RX ADMIN — GADOBUTROL 10 ML: 604.72 INJECTION INTRAVENOUS at 07:47

## 2023-10-30 ENCOUNTER — OFFICE VISIT (OUTPATIENT)
Dept: NEUROLOGY | Facility: CLINIC | Age: 53
End: 2023-10-30
Payer: COMMERCIAL

## 2023-10-30 VITALS
HEART RATE: 85 BPM | OXYGEN SATURATION: 96 % | BODY MASS INDEX: 37.25 KG/M2 | SYSTOLIC BLOOD PRESSURE: 122 MMHG | HEIGHT: 64 IN | DIASTOLIC BLOOD PRESSURE: 72 MMHG | TEMPERATURE: 97.3 F

## 2023-10-30 DIAGNOSIS — H90.42 SENSORINEURAL HEARING LOSS (SNHL) OF LEFT EAR WITH UNRESTRICTED HEARING OF RIGHT EAR: ICD-10-CM

## 2023-10-30 DIAGNOSIS — R42 PERSISTENT POSTURAL-PERCEPTUAL DIZZINESS: Primary | ICD-10-CM

## 2023-10-30 PROBLEM — H81.8X9 PERSISTENT POSTURAL-PERCEPTUAL DIZZINESS: Status: ACTIVE | Noted: 2023-10-30

## 2023-10-30 PROCEDURE — 99244 OFF/OP CNSLTJ NEW/EST MOD 40: CPT | Performed by: PSYCHIATRY & NEUROLOGY

## 2023-10-30 NOTE — PROGRESS NOTES
Patient ID: Ko Narvaez is a 46 y.o. female. Assessment/Plan:           Problem List Items Addressed This Visit          Nervous and Auditory    Sensorineural hearing loss (SNHL) of left ear with unrestricted hearing of right ear    Relevant Medications    5-Hydroxytryptophan 200 MG CAPS    Other Relevant Orders    XR spine cervical complete 4 or 5 vw non injury    Vitamin B12    Sjogren's Antibodies       Other    Persistent postural-perceptual dizziness - Primary    Relevant Medications    5-Hydroxytryptophan 200 MG CAPS    Other Relevant Orders    XR spine cervical complete 4 or 5 vw non injury    Vitamin B12    Sjogren's Antibodies         [address] sent to the [facility] multiple sclerosis center for evaluation of transient bouts of perceptual dizziness. Patient had suffered vertigo in 2013 and then a second bout of vertigo was 2019 with additional left sided sensorineural hearing loss been described by ENT team.  Patient had 2 MRIs done in 2021 and 2023 as we personally reviewed patient imaging. Patient has no acute or chronic ischemic or hemorrhagic changes, no signs of brainstem pathology with likely arachnoid cyst in posterior fossa has been noted. No signs of IAC pathology described. Patient clinical presentation consistent with 1-2 seconds of perception of wavy environments or unsteady environment with last bout of dizziness took place after 8 days with no signs of headache or migraine prior or after this events which rule out vestibular migraine. Considering clinical presentation and no other obvious medical condition, patient likely experiencing PPPD in the setting of normal brain function. Patient was offered to increase serotonin by using 5  mg capsule at nighttime.      Patient was advised to continue vestibular therapy and patient may benefit from pulmonary behavioral therapy if symptoms persist.    We also discussed cervicogenic dizziness as patient may have her symptoms about by bending her neck backwards. Cervical x-ray will be advised to complete her work-up. Patient has concern for underlying autoimmune condition with her brother having multiple sclerosis. Patient has elevated CCP with intermittent elevation of C-reactive protein but normal KARLA/ESR. Patient will be advised to consider Sjogren's antibodies and vitamin B12 levels considering vitamin D level is 24. Patient has no other focal sensorimotor dysfunction. Patient is to follow [facility] neurology on as-needed basis. Yvette Mica / Julienne Claudio comes and goes mostly that nothing else. HPI  Mrs. Darwin Jacobson has presented to [facility] multiple sclerosis center for evaluation. Having bouts of vertigo in 2013 requiring evaluation by ENT team as her vertigo lasted for 7 days. Patient also had another bout of vertigo in the past 19. Since that time patient having what she believes flareups without room spinning sensation but described as wavy lines. Patient stated her events lasted 1-2 seconds and her recent relapse lasted up to 8 days. Patient reports no vertigo at that time and 1 day she woke up without any symptoms. Patient reports no headache. Patient has serologic work-up completed with ESR/KARLA negative but elevated CCP antibodies with vitamin D level 24. Physical therapy for 3-5 years. No head injury , No neck injury, no remote history of concussion. MRI brain 8/2023: There is no discrete mass, mass effect or midline shift. Brainstem and cerebellum demonstrate normal signal. There is no intracranial hemorrhage. There is no evidence of acute infarction and diffusion imaging is unremarkable. There   are multiple small white matter hyperintensities which are scattered within the cerebral hemispheres, all less than 5 mm in size, without mass effect or diffusion abnormality. Normal postcontrast imaging. IAC'S:  No CP angle mass or abnormal enhancement.   Normal aeration of the mastoid air cells and middle ear cavity. The following portions of the patient's history were reviewed and updated as appropriate: She  has a past medical history of Acne, Anxiety, Alvarez esophagus, Colon polyp, Depression, GERD (gastroesophageal reflux disease), Lower back pain, Morbidly obese ([address]), PONV (postoperative nausea and vomiting), Vertigo, and Wears glasses. She   Patient Active Problem List    Diagnosis Date Noted    Sensorineural hearing loss (SNHL) of left ear with unrestricted hearing of right ear 10/30/2023    Persistent postural-perceptual dizziness 10/30/2023    Anxiety 10/18/2021    Depression 10/18/2021    Class 2 obesity in adult 10/18/2021    Left upper quadrant abdominal pain 09/29/2021    Spasm of bowel 09/17/2019    Alvarez's esophagus 09/17/2019    Epigastric pain 02/22/2019    Screen for colon cancer 02/22/2019    Atypical chest pain 09/07/2018    Gastroesophageal reflux disease without esophagitis 09/07/2018    Palpitations 09/07/2018    Shortness of breath 09/07/2018    Encounter for cosmetic surgery 03/02/2017    Excess skin of abdomen 09/01/2016     She  has a past surgical history that includes Laparoscopy for ectopic pregnancy; BODY LIFT LOWER (N/A, 3/2/2017); pr excision skin abd infraumbilical panniculectomy (N/A, 9/1/2016); pr suction assisted lipectomy trunk (N/A, 9/1/2016); pr esophagogastroduodenoscopy transoral diagnostic (N/A, 3/6/2019); Cholecystectomy; Dilation and curettage of uterus; [place] tooth extraction; pr mastopexy (Bilateral, 6/21/2019); Liposuction (N/A, 6/21/2019); Upper gastrointestinal endoscopy; and Colonoscopy. Her family history includes Multiple sclerosis in her brother. She was adopted. She  reports that she has never smoked. She has never used smokeless tobacco. She reports that she does not currently use drugs after having used the following drugs: Marijuana. She reports that she does not drink alcohol.   Current Outpatient Medications   Medication Sig Dispense Refill    5-Hydroxytryptophan 200 MG CAPS Take 1 capsule (200 mg total) by mouth daily at bedtime 90 capsule 0    acetaminophen (TYLENOL) 325 mg tablet Take 650 mg by mouth every 6 (six) hours as needed for mild pain      ALPRAZolam (XANAX) 0.25 mg tablet Take 1/2 to 1 tab PO q 8 hours prn vertigo 30 tablet 2    diazepam (VALIUM) 2 mg tablet Take 1 tablet PO 2 hours prior to MRI, may repeat 1 tab PO 1 hour prior to MRI; do not take with alprazolam. 2 tablet 0    dicyclomine (BENTYL) 10 mg capsule TAKE 1 CAPSULE (10 MG TOTAL) BY MOUTH 3 (THREE) TIMES A DAY AS NEEDED (ABDOMINAL CRAMPING) 270 capsule 1    meclizine (ANTIVERT) 25 mg tablet Take 1/2 to 1 tab PO q 8 hours prn vertigo 30 tablet 2    meloxicam (MOBIC) 7.5 mg tablet TAKE 1 2 TABLETS BY MOUTH DAILY AS NEEDED FOR PAIN      ondansetron (ZOFRAN) 4 mg tablet Take 1 ODT PO BID prn nausea/vomiting associated with vertigo 20 tablet 1    pantoprazole (PROTONIX) 40 mg tablet TAKE 1 TABLET BY MOUTH EVERY DAY 90 tablet 0    semaglutide, 0.25 or 0.5 mg/dose, (Ozempic, 0.25 or 0.5 MG/DOSE,) 2 mg/1.5 mL injection pen Inject 0.25 mg under the skin Once a week      tretinoin (RETIN-A) 0.05 % cream Apply 1 application topically daily at bedtime as needed      Cholecalciferol (Vitamin D3) 125 MCG (5000 UT) CAPS Take by mouth (Patient not taking: Reported on 10/30/2023)      predniSONE 10 mg tablet PLEASE SEE ATTACHED FOR DETAILED DIRECTIONS (Patient not taking: Reported on 9/29/2022)      Turmeric (QC TUMERIC COMPLEX PO) Take by mouth       No current facility-administered medications for this visit.      Current Outpatient Medications on File Prior to Visit   Medication Sig    acetaminophen (TYLENOL) 325 mg tablet Take 650 mg by mouth every 6 (six) hours as needed for mild pain    ALPRAZolam (XANAX) 0.25 mg tablet Take 1/2 to 1 tab PO q 8 hours prn vertigo    diazepam (VALIUM) 2 mg tablet Take 1 tablet PO 2 hours prior to MRI, may repeat 1 tab PO 1 hour prior to MRI; do not take with alprazolam.    dicyclomine (BENTYL) 10 mg capsule TAKE 1 CAPSULE (10 MG TOTAL) BY MOUTH 3 (THREE) TIMES A DAY AS NEEDED (ABDOMINAL CRAMPING)    meclizine (ANTIVERT) 25 mg tablet Take 1/2 to 1 tab PO q 8 hours prn vertigo    meloxicam (MOBIC) 7.5 mg tablet TAKE 1 2 TABLETS BY MOUTH DAILY AS NEEDED FOR PAIN    ondansetron (ZOFRAN) 4 mg tablet Take 1 ODT PO BID prn nausea/vomiting associated with vertigo    pantoprazole (PROTONIX) 40 mg tablet TAKE 1 TABLET BY MOUTH EVERY DAY    semaglutide, 0.25 or 0.5 mg/dose, (Ozempic, 0.25 or 0.5 MG/DOSE,) 2 mg/1.5 mL injection pen Inject 0.25 mg under the skin Once a week    tretinoin (RETIN-A) 0.05 % cream Apply 1 application topically daily at bedtime as needed    [DISCONTINUED] cyclobenzaprine (FLEXERIL) 10 mg tablet Take 10 mg by mouth 3 (three) times a day as needed    [DISCONTINUED] fluticasone (FLONASE) 50 mcg/act nasal spray 2 sprays into each nostril as needed    Cholecalciferol (Vitamin D3) 125 MCG (5000 UT) CAPS Take by mouth (Patient not taking: Reported on 10/30/2023)    predniSONE 10 mg tablet PLEASE SEE ATTACHED FOR DETAILED DIRECTIONS (Patient not taking: Reported on 9/29/2022)    Turmeric (QC TUMERIC COMPLEX PO) Take by mouth    [DISCONTINUED] Glucosamine-Chondroitin (OSTEO BI-FLEX REGULAR STRENGTH PO) Take by mouth    [DISCONTINUED] spironolactone (ALDACTONE) 50 mg tablet Take 50 mg by mouth daily. No current facility-administered medications on file prior to visit. She has No Known Allergies. .         Objective:    Blood pressure 122/72, pulse 85, temperature (!) 97.3 °F (36.3 °C), temperature source Temporal, height 5' 4" (1.626 m), SpO2 96 %. Physical Exam    Neurological Exam  CONSTITUTIONAL: NAD, pleasant. NECK: supple, no lymphadenopathy, no thyromegaly, no JVD. CARDIOVASCULAR: RRR, normal S1S2, no murmurs, no rubs. RESP: clear to auscultation bilaterally, no wheezes/rhonchi/rales. ABDOMEN: soft, non tender, non distended.  SKIN: no rash or skin lesions. EXTREMITIES: no edema, pulses 2+bilaterally. PSYCH: appropriate mood and affect  NEUROLOGIC COMPREHENSIVE EXAM: Patient is oriented to person, place and time, NAD; appropriate affect. CN II, III, IV, V, VI, VII,VIII,IX,X,XI-XII intact with EOMI, PERRLA, OKN intact, VF grossly intact, fundi poorly visualized secondary to pupillary constriction; symmetric face noted. Motor: 5/5 UE/LE bilateral symmetric; Sensory: intact to light touch and pinprick bilaterally; normal vibration sensation feet bilaterally; Coordination within normal limits on FTN and FRANCISCO JAVIER testing; DTR: 2/4 through, no Babinski, no clonus. Tandem gait is intact. Romberg: absent. ROS:    Review of Systems   Constitutional:  Negative for appetite change, fatigue and fever. HENT: Negative. Negative for hearing loss, tinnitus, trouble swallowing and voice change. Eyes: Negative. Negative for photophobia, pain and visual disturbance. Respiratory: Negative. Negative for shortness of breath. Cardiovascular: Negative. Negative for palpitations. Gastrointestinal: Negative. Negative for nausea and vomiting. Endocrine: Negative. Negative for cold intolerance. Genitourinary: Negative. Negative for dysuria, frequency and urgency. Musculoskeletal:  Negative for back pain, gait problem, myalgias and neck pain. Skin: Negative. Negative for rash. Allergic/Immunologic: Negative. Neurological:  Positive for light-headedness (comes and goes). Negative for dizziness, tremors, seizures, syncope, facial asymmetry, speech difficulty, weakness, numbness and headaches. Hematological: Negative. Does not bruise/bleed easily. Psychiatric/Behavioral: Negative. Negative for confusion, hallucinations and sleep disturbance. All other systems reviewed and are negative.

## 2023-11-29 ENCOUNTER — TELEPHONE (OUTPATIENT)
Age: 53
End: 2023-11-29

## 2023-11-29 ENCOUNTER — PREP FOR PROCEDURE (OUTPATIENT)
Age: 53
End: 2023-11-29

## 2023-11-29 DIAGNOSIS — K22.70 BARRETT'S ESOPHAGUS WITHOUT DYSPLASIA: Primary | ICD-10-CM

## 2023-11-29 NOTE — TELEPHONE ENCOUNTER
Scheduled date of EGD(as of today): 02/16/2024    Physician performing EGD: Lucinda Gibson    Location of EGD: [facility]    Instructions reviewed with patient by: JULISA Virk    Clearances: None    LIT from [facility] due to wanting a provider and office location closer to home.

## 2023-11-29 NOTE — TELEPHONE ENCOUNTER
OA Questions for EGD  Date: 11/29/2023  Screened by: AL     Referring Provider: Alec Rose     Pre-Screening: BMI 33.5  195lbs 5' 4"  Past EGD? 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 ·      Does the patient want to see a gastroenterologist prior to their procedure to discuss any GI symptoms? No    ·      Has the patient been hospitalized or had abdominal surgery in the past 6 months? No    ·      Does the patient use supplemental oxygen? No     ·      Does the patient take [Coumadin], [Lovenox], [Plavix], [Eliquis], [Xarelto], or other blood thinning medication? No    ·      Has the patient had a stroke, cardiac event, or stent placed in the past year? No       SCHEDULING STAFF: If patient answers NO to the above questions, then schedule the procedure. If patient answers YES to any of the above questions, then schedule an office appointment. ·       If a repeat EGD is belated and patient declines procedure à notify provider.

## 2024-01-31 ENCOUNTER — ANESTHESIA (OUTPATIENT)
Dept: ANESTHESIOLOGY | Facility: HOSPITAL | Age: 54
End: 2024-01-31

## 2024-01-31 ENCOUNTER — ANESTHESIA EVENT (OUTPATIENT)
Dept: ANESTHESIOLOGY | Facility: HOSPITAL | Age: 54
End: 2024-01-31

## 2024-02-06 ENCOUNTER — TELEPHONE (OUTPATIENT)
Dept: GASTROENTEROLOGY | Facility: MEDICAL CENTER | Age: 54
End: 2024-02-06

## 2024-02-06 NOTE — TELEPHONE ENCOUNTER
Patient chose to  cancel. Stated she will be out of town. She is at work now and will call back to reschedule.

## 2024-02-21 PROBLEM — Z12.11 SCREEN FOR COLON CANCER: Status: RESOLVED | Noted: 2019-02-22 | Resolved: 2024-02-21

## 2024-03-29 ENCOUNTER — TELEPHONE (OUTPATIENT)
Dept: GASTROENTEROLOGY | Facility: MEDICAL CENTER | Age: 54
End: 2024-03-29

## (undated) DEVICE — CHEST/BREAST DRAPE: Brand: CONVERTORS

## (undated) DEVICE — GLOVE INDICATOR PI UNDERGLOVE SZ 7 BLUE

## (undated) DEVICE — GLOVE INDICATOR PI UNDERGLOVE SZ 7.5 BLUE

## (undated) DEVICE — GLOVE SRG BIOGEL 7

## (undated) DEVICE — DRAPE TOWEL: Brand: CONVERTORS

## (undated) DEVICE — SCD SEQUENTIAL COMPRESSION COMFORT SLEEVE MEDIUM KNEE LENGTH: Brand: KENDALL SCD

## (undated) DEVICE — INTENDED FOR TISSUE SEPARATION, AND OTHER PROCEDURES THAT REQUIRE A SHARP SURGICAL BLADE TO PUNCTURE OR CUT.: Brand: BARD-PARKER ® CARBON RIB-BACK BLADES

## (undated) DEVICE — 3000CC GUARDIAN II: Brand: GUARDIAN

## (undated) DEVICE — SUT MONOCRYL 3-0 PS-2 27 IN Y427H

## (undated) DEVICE — 60 ML SYRINGE,TOOMEY TYPE: Brand: MONOJECT

## (undated) DEVICE — 2000CC GUARDIAN II: Brand: GUARDIAN

## (undated) DEVICE — SYRINGE 50ML LL

## (undated) DEVICE — Device

## (undated) DEVICE — GLOVE INDICATOR PI UNDERGLOVE SZ 6.5 BLUE

## (undated) DEVICE — DRAPE SHEET THREE QUARTER

## (undated) DEVICE — PREP PAD BNS: Brand: CONVERTORS

## (undated) DEVICE — REM POLYHESIVE ADULT PATIENT RETURN ELECTRODE: Brand: VALLEYLAB

## (undated) DEVICE — NEEDLE 25G X 1 1/2

## (undated) DEVICE — ABDOMINAL PAD: Brand: DERMACEA

## (undated) DEVICE — ELECTRODE BLADE MOD E-Z CLEAN  2.75IN 7CM -0012AM

## (undated) DEVICE — UNDYED MONOFILAMENT (POLYDIOXANONE), ABSORBABLE SURGICAL SUTURE: Brand: PDS

## (undated) DEVICE — STANDARD SURGICAL GOWN, L: Brand: CONVERTORS

## (undated) DEVICE — INVIEW CLEAR LEGGINGS: Brand: CONVERTORS

## (undated) DEVICE — ASTOUND STANDARD SURGICAL GOWN, XL: Brand: CONVERTORS

## (undated) DEVICE — PLUMEPEN PRO 10FT

## (undated) DEVICE — KERLIX BANDAGE ROLL: Brand: KERLIX

## (undated) DEVICE — SKIN MARKER DUAL TIP WITH RULER CAP, FLEXIBLE RULER AND LABELS: Brand: DEVON

## (undated) DEVICE — ADHESIVE SKN CLSR HISTOACRYL FLEX 0.5ML LF

## (undated) DEVICE — TUBING LIPOSUCTION ASPIRATION 12FT STERILE

## (undated) DEVICE — SPECIMEN CONTAINER STERILE PEEL PACK

## (undated) DEVICE — MEDI-VAC YANKAUER SUCTION HANDLE W/BULBOUS AND CONTROL VENT: Brand: CARDINAL HEALTH

## (undated) DEVICE — SUT VICRYL 2-0 SH 27 IN UNDYED J417H

## (undated) DEVICE — GLOVE SRG BIOGEL 7.5

## (undated) DEVICE — SYRINGE 20ML LL

## (undated) DEVICE — GLOVE SRG BIOGEL 6

## (undated) DEVICE — SPONGE LAP 18 X 18 IN

## (undated) DEVICE — PROXIMATE SKIN STAPLERS (35 WIDE) CONTAINS 35 STAINLESS STEEL STAPLES (FIXED HEAD): Brand: PROXIMATE

## (undated) DEVICE — ULTRASOUND GEL STERILE FOIL PK

## (undated) DEVICE — SUT PROLENE 1 CT-1 30 IN 8425H

## (undated) DEVICE — CHLORAPREP HI-LITE 26ML ORANGE

## (undated) DEVICE — TRAY FOLEY 16FR URIMETER SURESTEP

## (undated) DEVICE — BINDER ABDOMINAL 46-62 IN

## (undated) DEVICE — SYRINGE CATH TIP 50ML

## (undated) DEVICE — STRL UNIVERSAL LAPAROTOMY PACK: Brand: CARDINAL HEALTH

## (undated) DEVICE — UNDER BUTTOCKS DRAPE: Brand: CONVERTORS

## (undated) DEVICE — BETHLEHEM UNIVERSAL LAPAROTOMY: Brand: CARDINAL HEALTH

## (undated) DEVICE — GLOVE SRG BIOGEL 6.5

## (undated) DEVICE — TUBING SUCTION 5MM X 12 FT